# Patient Record
Sex: FEMALE | Race: WHITE | Employment: UNEMPLOYED | URBAN - METROPOLITAN AREA
[De-identification: names, ages, dates, MRNs, and addresses within clinical notes are randomized per-mention and may not be internally consistent; named-entity substitution may affect disease eponyms.]

---

## 2018-01-26 ENCOUNTER — OFFICE VISIT (OUTPATIENT)
Dept: ORTHOPEDIC SURGERY | Age: 83
End: 2018-01-26
Payer: MEDICARE

## 2018-01-26 VITALS — WEIGHT: 147 LBS | BODY MASS INDEX: 28.86 KG/M2 | HEIGHT: 60 IN

## 2018-01-26 DIAGNOSIS — M25.551 PAIN OF BOTH HIP JOINTS: Primary | ICD-10-CM

## 2018-01-26 DIAGNOSIS — M25.552 PAIN OF BOTH HIP JOINTS: Primary | ICD-10-CM

## 2018-01-26 PROCEDURE — G8484 FLU IMMUNIZE NO ADMIN: HCPCS | Performed by: ORTHOPAEDIC SURGERY

## 2018-01-26 PROCEDURE — 4040F PNEUMOC VAC/ADMIN/RCVD: CPT | Performed by: ORTHOPAEDIC SURGERY

## 2018-01-26 PROCEDURE — G8400 PT W/DXA NO RESULTS DOC: HCPCS | Performed by: ORTHOPAEDIC SURGERY

## 2018-01-26 PROCEDURE — 1036F TOBACCO NON-USER: CPT | Performed by: ORTHOPAEDIC SURGERY

## 2018-01-26 PROCEDURE — 1123F ACP DISCUSS/DSCN MKR DOCD: CPT | Performed by: ORTHOPAEDIC SURGERY

## 2018-01-26 PROCEDURE — 99213 OFFICE O/P EST LOW 20 MIN: CPT | Performed by: ORTHOPAEDIC SURGERY

## 2018-01-26 PROCEDURE — 1090F PRES/ABSN URINE INCON ASSESS: CPT | Performed by: ORTHOPAEDIC SURGERY

## 2018-01-26 PROCEDURE — G8427 DOCREV CUR MEDS BY ELIG CLIN: HCPCS | Performed by: ORTHOPAEDIC SURGERY

## 2018-01-26 PROCEDURE — G8419 CALC BMI OUT NRM PARAM NOF/U: HCPCS | Performed by: ORTHOPAEDIC SURGERY

## 2018-01-26 NOTE — PROGRESS NOTES
Mihaela Card M.D.            [address] [place] [address]., [address] [facility], [address] [facility]             Dept Phone: [phone]             Dept Fax:  [phone] returns today. She is a history of bilateral hip replacements. She's here basically because she's having difficulty ambulating. She has been seeing a specialist over at [facility] who has given her injections and she's not sure this helped out. She doesn't really complain really much of any hip pain per se    Physical examination notes that the patient has no pain on flexion internal or external rotation of her hips. She does have moderate straight leg raise on both sides. I did not do formal neurologic examination. Remainder examination noncontributory    Patient did have x-rays of her pelvis taken today reviewed by me. Her left total hips look to be good position on AP and lateral views. She does have some moderate erosion of the trochanter laterally on the right side but this is unchanged from previous x-rays. I am able to determine that she does have some degenerative scoliosis from the lower lumbar spine that we can see. Impression line status post bilateral total hips doing well  Degenerative scoliosis with as neurogenic claudication/spinal stenosis    Plan  I told the patient and her daughter who is here with her that if her symptoms exacerbate she is beginning to have fall she may need to have something more interventional than just injections. Apparently she had an MRI 6 months ago is been seen elsewhere for this. I did suggest that she does see a spinal specialist of her symptoms exacerbate. Otherwise back here on a when necessary basis          Review of Systems   Constitutional: Negative. HENT: Negative. Respiratory: Negative. Cardiovascular: Negative. Musculoskeletal: Negative. Neurological: Negative.          Past Medical History:   Diagnosis Date

## 2021-05-13 ENCOUNTER — IMMUNIZATION (OUTPATIENT)
Dept: PRIMARY CARE CLINIC | Age: 86
End: 2021-05-13
Payer: MEDICARE

## 2021-05-13 PROCEDURE — 0001A COVID-19, PFIZER VACCINE 30MCG/0.3ML DOSE: CPT | Performed by: INTERNAL MEDICINE

## 2021-05-13 PROCEDURE — 91300 COVID-19, PFIZER VACCINE 30MCG/0.3ML DOSE: CPT | Performed by: INTERNAL MEDICINE

## 2021-06-03 ENCOUNTER — IMMUNIZATION (OUTPATIENT)
Dept: PRIMARY CARE CLINIC | Age: 86
End: 2021-06-03
Payer: MEDICARE

## 2021-06-03 PROCEDURE — 91300 COVID-19, PFIZER VACCINE 30MCG/0.3ML DOSE: CPT | Performed by: INTERNAL MEDICINE

## 2021-06-03 PROCEDURE — 0002A COVID-19, PFIZER VACCINE 30MCG/0.3ML DOSE: CPT | Performed by: INTERNAL MEDICINE

## 2021-09-09 ENCOUNTER — OFFICE VISIT (OUTPATIENT)
Dept: ORTHOPEDIC SURGERY | Age: 86
End: 2021-09-09
Payer: MEDICARE

## 2021-09-09 DIAGNOSIS — M25.552 BILATERAL HIP PAIN: Primary | ICD-10-CM

## 2021-09-09 DIAGNOSIS — M25.551 BILATERAL HIP PAIN: Primary | ICD-10-CM

## 2021-09-09 PROCEDURE — 4004F PT TOBACCO SCREEN RCVD TLK: CPT | Performed by: ORTHOPAEDIC SURGERY

## 2021-09-09 PROCEDURE — G8428 CUR MEDS NOT DOCUMENT: HCPCS | Performed by: ORTHOPAEDIC SURGERY

## 2021-09-09 PROCEDURE — 1090F PRES/ABSN URINE INCON ASSESS: CPT | Performed by: ORTHOPAEDIC SURGERY

## 2021-09-09 PROCEDURE — 1123F ACP DISCUSS/DSCN MKR DOCD: CPT | Performed by: ORTHOPAEDIC SURGERY

## 2021-09-09 PROCEDURE — G8421 BMI NOT CALCULATED: HCPCS | Performed by: ORTHOPAEDIC SURGERY

## 2021-09-09 PROCEDURE — 4040F PNEUMOC VAC/ADMIN/RCVD: CPT | Performed by: ORTHOPAEDIC SURGERY

## 2021-09-09 PROCEDURE — 99213 OFFICE O/P EST LOW 20 MIN: CPT | Performed by: ORTHOPAEDIC SURGERY

## 2021-09-09 NOTE — PROGRESS NOTES
Soren Herrmann M.D.            [address] [facility] [address] [facility],[address], [address] [facility]           Dept Phone: [phone]           Dept Fax:  [phone] [address], [place]          Dept Phone: [phone]           Dept Fax:  [phone]      Chief Compliant:  Chief Complaint   Patient presents with    Pain     bilateral hip        History of Present Illness: This is a 80 y.o. female who presents to the clinic today for evaluation / follow up of history of bilateral hip replacements. Patient also has significant history of severe spinal stenosis. She was told that she had nonoperative. She does have claudication symptoms. Patient did have a history of a fall approximately month ago. She has complaints of bilateral shoulder pain but this is chronic for her as she has what appears to be chronic rotator cuff arthropathies. She is basically here today just to verify nothing is wrong with her hips. .       Review of Systems   Constitutional: Negative for fever, chills, sweats. Eyes: Negative for changes in vision, or pain. HENT: Negative for ear ache, epistaxis, or sore throat. Respiratory/Cardio: Negative for Chest pain, palpitations, SOB, or cough. Gastrointestinal: Negative for abdominal pain, N/V/D. Genitourinary: Negative for dysuria, frequency, urgency, or hematuria. Neurological: Negative for headache, numbness, or weakness. Integumentary: Negative for rash, itching, laceration, or abrasion. Musculoskeletal: Positive for Pain (bilateral hip)       Physical Exam:  Constitutional: Patient is oriented to person, place, and time. Patient appears well-developed and well nourished.    HENT: Negative otherwise noted  Head: Normocephalic and Atraumatic  Nose: Normal  Eyes: Conjunctivae and EOM are normal  Neck: Normal range of motion Neck supple. Respiratory/Cardio: Effort normal. No respiratory distress. Musculoskeletal: Physical examination notes I can motion the patient's left hip with no obvious discomfort. She could I actively flex her hip and I can internally X rotate with no obvious discomfort. No trochanteric tenderness. Examination of the patient's right hip notes she has very minimal discomfort on flexion internal ex rotation. She has some mild trochanteric tenderness on the right side but this is chronic for her as she does have a chronic erosion of her greater trochanter  Neurological: Patient is alert and oriented to person, place, and time. Normal strenght. No sensory deficit. Skin: Skin is warm and dry  Psychiatric: Behavior is normal. Thought content normal.  Nursing note and vitals reviewed. Labs and Imaging:     XR taken today:  XR PELVIS (1-2 VIEWS)    Result Date: 9/9/2021  X-rays today today reviewed by me show AP of the pelvis. Patient status post bilateral total hips. The patient's right hip has a constrained prosthesis and is stable acetabular femoral components. Patient does have bony erosion of the greater trochanter likely chronic to stress and revision setting. No acute process is noted. Patient has a left total hip arthroplasty as well. She has a cerclage wires associated with this no acute changes noted compared to views taken in the past.          Orders Placed This Encounter   Procedures    XR PELVIS (1-2 VIEWS)     Standing Status:   Future     Number of Occurrences:   1     Standing Expiration Date:   9/9/2022       Assessment and Plan:  1. Bilateral hip pain            This is a 80 y.o. female who presents to the clinic today for evaluation / follow up of status post bilateral total hips with no obvious acute injury.      Past History:    Current Outpatient Medications:     gabapentin (NEURONTIN) 100 MG capsule,  100 mg 3 times daily , Disp: , Rfl:     losartan (COZAAR) 100 MG tablet,  100 mg daily , Disp: , Rfl:     meloxicam (MOBIC) 15 MG tablet,  Take 15 mg by mouth daily , Disp: , Rfl:     omeprazole (PRILOSEC) 20 MG capsule,  20 mg Daily , Disp: , Rfl:     propranolol (INDERAL LA) 60 MG CR capsule,  60 mg daily , Disp: , Rfl:     raloxifene (EVISTA) 60 MG tablet,  60 mg daily , Disp: , Rfl:     traZODone (DESYREL) 100 MG tablet,  nightly , Disp: , Rfl:     aspirin 325 MG tablet, Take 325 mg by mouth daily, Disp: , Rfl:   Allergies   Allergen Reactions    Adhesive Tape Other (See Comments)     blisters    Ceclor [Cefaclor] Other (See Comments)     cough    Codeine Other (See Comments)     Too sleepy    Penicillins Hives    Vioxx [Rofecoxib] Hives    Morphine Nausea And Vomiting and Other (See Comments)     sleepy     Social History     Socioeconomic History    Marital status:      Spouse name: Not on file    Number of children: Not on file    Years of education: Not on file    Highest education level: Not on file   Occupational History    Occupation: retired   Tobacco Use    Smoking status: Former Smoker     Quit date: 1990     Years since quittin.2   Substance and Sexual Activity    Alcohol use: No    Drug use: No    Sexual activity: Not on file   Other Topics Concern    Not on file   Social History Narrative    Not on file     Social Determinants of Health     Financial Resource Strain:     Difficulty of Paying Living Expenses:    Food Insecurity:     Worried About [address] in the Last Year:     [facility] in the Last Year:    Transportation Needs:     Lack of Transportation (Medical):      Lack of Transportation (Non-Medical):    Physical Activity:     Days of Exercise per Week:     Minutes of Exercise per Session:    Stress:     Feeling of Stress :    Social Connections:     Frequency of Communication with Friends and Family:     Frequency of Social Gatherings with Friends and Family:     Attends Sabianist Services:     Active Member of Clubs or Organizations:     Attends Club or Organization Meetings:     Marital Status:    Intimate Partner Violence:     Fear of Current or Ex-Partner:     Emotionally Abused:     Physically Abused:     Sexually Abused:      Past Medical History:   Diagnosis Date    Arthritis     Diabetes mellitus ([address]     GERD (gastroesophageal reflux disease)     Hypertension      Past Surgical History:   Procedure Laterality Date    APPENDECTOMY      CARPAL TUNNEL RELEASE Right     JOINT REPLACEMENT Right     has had both right and left done (4 total)    OVARY REMOVAL Right     WRIST SURGERY Left 6-25-15    carpel tunnel, thumb surgery     No family history on file. Plan  Patient as well as patient's daughter performed and no acute findings are noted. They are happy to hear this. She was strongly advised to use a walker at all times rather than just a cane is certainly would love to avoid any falls and potentially future fractures. We will see her back here on an as-needed basis call if any problems prior    Provider Attestation:  Ángel Lopez, personally performed the services described in this documentation. All medical record entries made by the scribe were at my direction and in my presence. I have reviewed the chart and discharge instructions and agree that the records reflect my personal performance and is accurate and complete. Anupam Douglas MD. 09/09/21      Please note that this chart was generated using voice recognition Dragon dictation software. Although every effort was made to ensure the accuracy of this automated transcription, some errors in transcription may have occurred.

## 2022-03-16 ENCOUNTER — OFFICE VISIT (OUTPATIENT)
Dept: ORTHOPEDIC SURGERY | Age: 87
End: 2022-03-16
Payer: MEDICARE

## 2022-03-16 VITALS — WEIGHT: 147 LBS | BODY MASS INDEX: 28.86 KG/M2 | HEIGHT: 60 IN

## 2022-03-16 DIAGNOSIS — M25.511 RIGHT SHOULDER PAIN, UNSPECIFIED CHRONICITY: Primary | ICD-10-CM

## 2022-03-16 DIAGNOSIS — M25.512 LEFT SHOULDER PAIN, UNSPECIFIED CHRONICITY: ICD-10-CM

## 2022-03-16 PROCEDURE — 4040F PNEUMOC VAC/ADMIN/RCVD: CPT | Performed by: ORTHOPAEDIC SURGERY

## 2022-03-16 PROCEDURE — 1123F ACP DISCUSS/DSCN MKR DOCD: CPT | Performed by: ORTHOPAEDIC SURGERY

## 2022-03-16 PROCEDURE — G8417 CALC BMI ABV UP PARAM F/U: HCPCS | Performed by: ORTHOPAEDIC SURGERY

## 2022-03-16 PROCEDURE — G8428 CUR MEDS NOT DOCUMENT: HCPCS | Performed by: ORTHOPAEDIC SURGERY

## 2022-03-16 PROCEDURE — 99203 OFFICE O/P NEW LOW 30 MIN: CPT | Performed by: ORTHOPAEDIC SURGERY

## 2022-03-16 PROCEDURE — 20605 DRAIN/INJ JOINT/BURSA W/O US: CPT | Performed by: ORTHOPAEDIC SURGERY

## 2022-03-16 PROCEDURE — 1036F TOBACCO NON-USER: CPT | Performed by: ORTHOPAEDIC SURGERY

## 2022-03-16 PROCEDURE — G8484 FLU IMMUNIZE NO ADMIN: HCPCS | Performed by: ORTHOPAEDIC SURGERY

## 2022-03-16 PROCEDURE — 1090F PRES/ABSN URINE INCON ASSESS: CPT | Performed by: ORTHOPAEDIC SURGERY

## 2022-03-16 RX ORDER — AMITRIPTYLINE HYDROCHLORIDE 10 MG/1
10 TABLET, FILM COATED ORAL DAILY
COMMUNITY
Start: 2022-01-13

## 2022-03-16 RX ORDER — TRIAMCINOLONE ACETONIDE 40 MG/ML
40 INJECTION, SUSPENSION INTRA-ARTICULAR; INTRAMUSCULAR ONCE
Status: COMPLETED | OUTPATIENT
Start: 2022-03-16 | End: 2022-03-16

## 2022-03-16 RX ORDER — LIDOCAINE HYDROCHLORIDE 10 MG/ML
5 INJECTION, SOLUTION INFILTRATION; PERINEURAL ONCE
Status: COMPLETED | OUTPATIENT
Start: 2022-03-16 | End: 2022-03-16

## 2022-03-16 RX ORDER — LEVETIRACETAM 250 MG/1
250 TABLET ORAL DAILY
COMMUNITY
Start: 2021-11-11

## 2022-03-16 RX ADMIN — TRIAMCINOLONE ACETONIDE 40 MG: 40 INJECTION, SUSPENSION INTRA-ARTICULAR; INTRAMUSCULAR at 15:23

## 2022-03-16 RX ADMIN — LIDOCAINE HYDROCHLORIDE 5 ML: 10 INJECTION, SOLUTION INFILTRATION; PERINEURAL at 15:22

## 2022-03-16 NOTE — PROGRESS NOTES
Orthopedic Shoulder Encounter Note     Chief complaint: bilateral shoulder pain    HPI: Mikaela Broderick is a 80 y.o. right-hand-dominant female presenting for evaluation of both of her shoulders. She states that she has had pain for 20 years in both shoulders but her left shoulder hurts significantly more than the right. It is getting worse. She states that she has diffuse pain in her shoulders which is constant but worse with use. She does have associated stiffness and weakness. Previous treatment:    NSAIDs: Mobic    Physical Therapy: In the past    Injections: None    Surgeries: History of a left shoulder manipulation under anesthesia at least 30 years ago    Review of Systems:   Constitutional: Negative for fever, chills, sweats. Pain Score:   9  Neurological: Negative for headache, numbness, or weakness. Musculoskeletal: As noted in HPI     Past Medical History  Fidel Carlson  has a past medical history of Arthritis, Diabetes mellitus ([address] GERD (gastroesophageal reflux disease), and Hypertension. Past Surgical History  Fidel Carlson  has a past surgical history that includes joint replacement (Right); Appendectomy; Ovary removal (Right); Carpal tunnel release (Right); and Wrist surgery (Left, 6-25-15).     Current Medications  Current Outpatient Medications   Medication Sig Dispense Refill    levETIRAcetam (KEPPRA) 250 MG tablet Take 250 mg by mouth daily      meloxicam (MOBIC) 15 MG tablet   Take 15 mg by mouth daily       propranolol (INDERAL LA) 60 MG CR capsule   60 mg daily       raloxifene (EVISTA) 60 MG tablet   60 mg daily       traZODone (DESYREL) 100 MG tablet   nightly       amitriptyline (ELAVIL) 10 MG tablet Take 10 mg by mouth daily      gabapentin (NEURONTIN) 100 MG capsule   100 mg 3 times daily  (Patient not taking: Reported on 3/16/2022)      losartan (COZAAR) 100 MG tablet   100 mg daily  (Patient not taking: Reported on 3/16/2022)      omeprazole (PRILOSEC) 20 MG capsule   20 mg Daily  (Patient not taking: Reported on 3/16/2022)      aspirin 325 MG tablet Take 325 mg by mouth daily       No current facility-administered medications for this visit. Allergies  Allergies have been reviewed. Jean Paul Kowalski is allergic to adhesive tape, ceclor [cefaclor], codeine, penicillins, vioxx [rofecoxib], and morphine. Social History  Jean Paul Kowalski  reports that she quit smoking about 31 years ago. She has never used smokeless tobacco. She reports that she does not drink alcohol and does not use drugs. Family History  Nargis's family history is not on file. Physical Exam:     Ht 5' (1.524 m)   Wt 147 lb (66.7 kg)   BMI 28.71 kg/m²    Constitutional: Patient is oriented to person, place, and time. Patient appears well-developed and well nourished. Mental Status/psychiatric: Behavior is normal. Thought content normal.  HENT: Negative otherwise noted  Head: Normocephalic and Atraumatic  Nose: Normal  Respiratory/Cardio: Effort normal. No respiratory distress. Shoulder:    Skin: Skin is warm and dry; no swelling or obvious muscular atrophy.    Vasculature: 2+ radial pulses bilaterally  Neuro: Sensation grossly intact to light touch diffusely  Tenderness: Tender to palpation over the anterior aspect of both shoulders    ROM: (Degrees)    Right   A P   Left   A P    Elevation  135    Elevation  85   Abduction  120    Abduction  100    ER   80    ER   30   IR   L4    IR   L4   90 abd/ER      90 abd/ER     90 abd/IR      90 abd/IR     Crepitation  No    Crepitation Yes  Dyskenesia  No    Dyskenesia No      Muscle strength:    Right       Left    Deltoid   5    Deltoid   5  Supraspinatus  5    Supraspinatus  5  ER   5    ER   5  IR   5    IR   5    Special tests    Right   Rotator Cuff    Left    y   Painful arc    y   n   Pain with ER    y    n   Neer's     y    n   Hawkin's    y    n   Drop Arm    n  n   Lift off/Belly Press   n  n   ER Lag    n          [facility] Joint  n   [facility] tenderness   n  n   Cross-chest adduction  n       Labrum/biceps    n   Latah's    y   n   Biceps sheer    n      n   Speed's/Yergason's   y    y   Tenderness Biceps Groove  y    n   Titi's    n         Instability  n   Ant Apprehension   n    n   Post Apprehension   n    n   Ant Load shift    n    n   Post Load shift   n   n   Sulcus     n  n   Generalized Laxity   n  n   Relocation test   n  n   Crank test     n  n   Edmond-superior escape  n       Imaging:  Xrays: 4 views of the right shoulder obtained on 3/16/2022 were independently reviewed  Indications: Right shoulder pain  Findings: Glenohumeral joint space and acromioclavicular joint space appear to be well preserved. Some cystic changes involving the greater tuberosity. No obvious fracture dislocation or subluxation. Impression: Normal-appearing right shoulder radiographs with findings as outlined above. Xrays: 4 views of the left shoulder obtained on 3/16/2022 were independently reviewed  Indications: Left shoulder pain  Findings: Complete or near complete glenohumeral joint space loss with a central pattern of glenoid wear and small sized inferior humeral head osteophyte. Impression: Left shoulder radiographs with severe degenerative changes involving the glenohumeral joint as outlined above. Impression/Plan:     Chichi Sterling is a 80 y.o. old female with bilateral shoulder pain but the left shoulder appears to be what is bothering her the most today. 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 I had a discussion with the patient and her daughter today educating them both about her shoulders condition as well as treatment options available to her. I did recommend proceeding conservatively and so she would like to try cortisone injection today which was administered as outlined below.   I will have her follow-up my clinic as needed but she may return or call at anytime with

## 2022-05-13 ENCOUNTER — OFFICE VISIT (OUTPATIENT)
Dept: ORTHOPEDIC SURGERY | Age: 87
End: 2022-05-13
Payer: MEDICARE

## 2022-05-13 DIAGNOSIS — M25.552 HIP PAIN, CHRONIC, LEFT: Primary | ICD-10-CM

## 2022-05-13 DIAGNOSIS — G89.29 HIP PAIN, CHRONIC, LEFT: Primary | ICD-10-CM

## 2022-05-13 PROCEDURE — 99213 OFFICE O/P EST LOW 20 MIN: CPT | Performed by: ORTHOPAEDIC SURGERY

## 2022-05-13 PROCEDURE — G8428 CUR MEDS NOT DOCUMENT: HCPCS | Performed by: ORTHOPAEDIC SURGERY

## 2022-05-13 PROCEDURE — 1090F PRES/ABSN URINE INCON ASSESS: CPT | Performed by: ORTHOPAEDIC SURGERY

## 2022-05-13 PROCEDURE — 1123F ACP DISCUSS/DSCN MKR DOCD: CPT | Performed by: ORTHOPAEDIC SURGERY

## 2022-05-13 PROCEDURE — G8417 CALC BMI ABV UP PARAM F/U: HCPCS | Performed by: ORTHOPAEDIC SURGERY

## 2022-05-13 PROCEDURE — 4040F PNEUMOC VAC/ADMIN/RCVD: CPT | Performed by: ORTHOPAEDIC SURGERY

## 2022-05-13 PROCEDURE — 1036F TOBACCO NON-USER: CPT | Performed by: ORTHOPAEDIC SURGERY

## 2022-05-13 RX ORDER — METHYLPREDNISOLONE 4 MG/1
4 TABLET ORAL SEE ADMIN INSTRUCTIONS
Qty: 1 KIT | Refills: 0 | Status: SHIPPED | OUTPATIENT
Start: 2022-05-13 | End: 2022-05-19

## 2022-05-13 NOTE — PROGRESS NOTES
Irwin Danielle M.D.            [address] [place] [address]., [address] [facility],[address], [address] [facility]           Dept Phone: [phone]           Dept Fax:  [phone]           [place]          Dept Phone: [phone]           Dept Fax:  [phone]      Chief Compliant:  Chief Complaint   Patient presents with    Pain     Lt hip        History of Present Illness: This is a 80 y.o. female who presents to the clinic today for evaluation / follow up of left buttock pain. Patient is 51-year-old female who has a history of bilateral total hip arthroplasties done many years ago. She has not had any recent trauma or fall. She is also noted to have a here history of pretty severe spinal stenosis. She is complaining of left buttock pain going down her leg. She does not really have any hip pain per se. .       Review of Systems   Constitutional: Negative for fever, chills, sweats. Eyes: Negative for changes in vision, or pain. HENT: Negative for ear ache, epistaxis, or sore throat. Respiratory/Cardio: Negative for Chest pain, palpitations, SOB, or cough. Gastrointestinal: Negative for abdominal pain, N/V/D. Genitourinary: Negative for dysuria, frequency, urgency, or hematuria. Neurological: Negative for headache, numbness, or weakness. Integumentary: Negative for rash, itching, laceration, or abrasion. Musculoskeletal: Positive for Pain (Lt hip)       Physical Exam:  Constitutional: Patient is oriented to person, place, and time. Patient appears well-developed and well nourished. HENT: Negative otherwise noted  Head: Normocephalic and Atraumatic  Nose: Normal  Eyes: Conjunctivae and EOM are normal  Neck: Normal range of motion Neck supple. Respiratory/Cardio: Effort normal. No respiratory distress.   Musculoskeletal: Examination notes that she has no pain whatsoever on flexion internal ex Tatian of her left hip. She has tenderness in her sciatic notch area and this is traceable down her leg. Mildly positive straight leg raise no other contributory findings  Neurological: Patient is alert and oriented to person, place, and time. Normal strenght. No sensory deficit. Skin: Skin is warm and dry  Psychiatric: Behavior is normal. Thought content normal.  Nursing note and vitals reviewed. Labs and Imaging:     XR taken today: None taken today  No results found. Orders Placed This Encounter   Procedures    [facility]     Referral Priority:   Routine     Referral Type:   Eval and Treat     Referral Reason:   Specialty Services Required     Requested Specialty:   Physical Therapist     Number of Visits Requested:   1       Assessment and Plan:  Status post bilateral total hips  Severe spinal stenosis  Sciatica left        This is a 80 y.o. female who presents to the clinic today for evaluation / follow up of sciatic pain left.      Past History:    Current Outpatient Medications:     methylPREDNISolone (MEDROL DOSEPACK) 4 MG tablet, Take 1 tablet by mouth See Admin Instructions for 6 days Take by mouth., Disp: 1 kit, Rfl: 0    amitriptyline (ELAVIL) 10 MG tablet, Take 10 mg by mouth daily, Disp: , Rfl:     levETIRAcetam (KEPPRA) 250 MG tablet, Take 250 mg by mouth daily, Disp: , Rfl:     gabapentin (NEURONTIN) 100 MG capsule,  100 mg 3 times daily  (Patient not taking: Reported on 3/16/2022), Disp: , Rfl:     losartan (COZAAR) 100 MG tablet,  100 mg daily  (Patient not taking: Reported on 3/16/2022), Disp: , Rfl:     meloxicam (MOBIC) 15 MG tablet,  Take 15 mg by mouth daily , Disp: , Rfl:     omeprazole (PRILOSEC) 20 MG capsule,  20 mg Daily  (Patient not taking: Reported on 3/16/2022), Disp: , Rfl:     propranolol (INDERAL LA) 60 MG CR capsule,  60 mg daily , Disp: , Rfl:     raloxifene (EVISTA) 60 MG tablet,  60 mg daily , Disp: , Rfl:     traZODone (DESYREL) 100 MG tablet,  nightly , Disp: , Rfl:     aspirin 325 MG tablet, Take 325 mg by mouth daily, Disp: , Rfl:   Allergies   Allergen Reactions    Adhesive Tape Other (See Comments)     blisters    Ceclor [Cefaclor] Other (See Comments)     cough    Codeine Other (See Comments)     Too sleepy    Penicillins Hives    Vioxx [Rofecoxib] Hives    Morphine Nausea And Vomiting and Other (See Comments)     sleepy     Social History     Socioeconomic History    Marital status:      Spouse name: Not on file    Number of children: Not on file    Years of education: Not on file    Highest education level: Not on file   Occupational History    Occupation: retired   Tobacco Use    Smoking status: Former Smoker     Quit date: 1990     Years since quittin.9    Smokeless tobacco: Never Used   Substance and Sexual Activity    Alcohol use: No    Drug use: No    Sexual activity: Not on file   Other Topics Concern    Not on file   Social History Narrative    Not on file     Social Determinants of Health     Financial Resource Strain:     Difficulty of Paying Living Expenses: Not on file   Food Insecurity:     Worried About [address] in the Last Year: Not on file    [address] in the Last Year: Not on file   Transportation Needs:     Lack of Transportation (Medical): Not on file    Lack of Transportation (Non-Medical):  Not on file   Physical Activity:     Days of Exercise per Week: Not on file    Minutes of Exercise per Session: Not on file   Stress:     Feeling of Stress : Not on file   Social Connections:     Frequency of Communication with Friends and Family: Not on file    Frequency of Social Gatherings with Friends and Family: Not on file    Attends Latter-day Services: Not on file    Active Member of Clubs or Organizations: Not on file    Attends Club or Organization Meetings: Not on file    Marital Status: Not on file Intimate Partner Violence:     Fear of Current or Ex-Partner: Not on file    Emotionally Abused: Not on file    Physically Abused: Not on file    Sexually Abused: Not on file   Housing Stability:     Unable to Pay for Housing in the Last Year: Not on file    Number of [place] in the Last Year: Not on file    Unstable Housing in the Last Year: Not on file     Past Medical History:   Diagnosis Date    Arthritis     Diabetes mellitus ([address]     GERD (gastroesophageal reflux disease)     Hypertension      Past Surgical History:   Procedure Laterality Date    APPENDECTOMY      CARPAL TUNNEL RELEASE Right     JOINT REPLACEMENT Right     has had both right and left done (4 total)    OVARY REMOVAL Right     WRIST SURGERY Left 6-25-15    carpel tunnel, thumb surgery     No family history on file. Plan  Patient was given a prescription for physical therapy to be dressed this area also Medrol Dosepak. She is aware that she has severe lumbar degenerative disc disease but at age 80 she does not wish to have any done. Back here. Provider Attestation:  Shelly Coughlin, personally performed the services described in this documentation. All medical record entries made by the scribe were at my direction and in my presence. I have reviewed the chart and discharge instructions and agree that the records reflect my personal performance and is accurate and complete. Guilherme Miramontes MD. 05/13/22      Please note that this chart was generated using voice recognition Dragon dictation software. Although every effort was made to ensure the accuracy of this automated transcription, some errors in transcription may have occurred.

## 2022-05-19 ENCOUNTER — TELEPHONE (OUTPATIENT)
Dept: ORTHOPEDIC SURGERY | Age: 87
End: 2022-05-19

## 2022-05-19 NOTE — TELEPHONE ENCOUNTER
Patient called stating that her mother needs home therapy due to be ing unable to drive. She was ordered outpatient PT at her last appointment. Patients daughter was intially told that the order can be put I n and some one from the agency will get back with her. However writer was informed that patient has to call insurance, see what agency is covered and the order can then be place,she was left a VM with recommendations

## 2023-01-18 ENCOUNTER — OFFICE VISIT (OUTPATIENT)
Dept: ORTHOPEDIC SURGERY | Age: 88
End: 2023-01-18
Payer: MEDICARE

## 2023-01-18 VITALS — BODY MASS INDEX: 28.86 KG/M2 | HEIGHT: 60 IN | WEIGHT: 147 LBS | RESPIRATION RATE: 16 BRPM

## 2023-01-18 DIAGNOSIS — M19.012 OSTEOARTHRITIS OF GLENOHUMERAL JOINT, LEFT: Primary | ICD-10-CM

## 2023-01-18 DIAGNOSIS — M25.512 ACUTE PAIN OF LEFT SHOULDER: ICD-10-CM

## 2023-01-18 PROCEDURE — 99999 PR OFFICE/OUTPT VISIT,PROCEDURE ONLY: CPT | Performed by: ORTHOPAEDIC SURGERY

## 2023-01-18 PROCEDURE — 20610 DRAIN/INJ JOINT/BURSA W/O US: CPT | Performed by: ORTHOPAEDIC SURGERY

## 2023-01-18 RX ORDER — LIDOCAINE HYDROCHLORIDE 10 MG/ML
5 INJECTION, SOLUTION INFILTRATION; PERINEURAL ONCE
Status: COMPLETED | OUTPATIENT
Start: 2023-01-18 | End: 2023-01-18

## 2023-01-18 RX ORDER — TRIAMCINOLONE ACETONIDE 40 MG/ML
40 INJECTION, SUSPENSION INTRA-ARTICULAR; INTRAMUSCULAR ONCE
Status: COMPLETED | OUTPATIENT
Start: 2023-01-18 | End: 2023-01-18

## 2023-01-18 RX ADMIN — LIDOCAINE HYDROCHLORIDE 5 ML: 10 INJECTION, SOLUTION INFILTRATION; PERINEURAL at 16:29

## 2023-01-18 RX ADMIN — TRIAMCINOLONE ACETONIDE 40 MG: 40 INJECTION, SUSPENSION INTRA-ARTICULAR; INTRAMUSCULAR at 16:30

## 2023-01-18 NOTE — PROGRESS NOTES
HPI: Ms. Elba Castillo is a 15-year-old who is presenting today for reevaluation of her left shoulder. She was seen in my clinic close to a year ago and diagnosed with severe arthritis in the shoulder. She did receive a cortisone injection at that time which she states helped a little bit. If not clear how long the effects of the injection lasted. She aggravated the shoulder 3 weeks ago when she tripped on her footstool and fell. At this time her pain is rated as a 9/10. Physical examination:  Evaluation of the patient's left shoulder and upper extremity demonstrates intact skin without warmth, erythema, or significant swelling. She has limitation in active and passive range of motion due to pain and she does have crepitation with range of motion. Sensation is grossly intact light touch in all dermatomes and she has a 2+ radial pulse with brisk capillary refill in her fingers. Imaging studies:  4 x-ray views of the left shoulder completed on 1/18/2023 were reviewed independently demonstrating complete glenohumeral joint space loss with a central pattern of glenoid wear. No obvious fracture, dislocation or subluxation. Impression and plan: Ms. Elba Castillo is a 15-year-old with severe left glenohumeral joint osteoarthritis. We once again had a discussion about treatment options moving forward including continued conservative management and surgical intervention. She is not interested in any surgery and so we are going to continue to proceed with conservative management. To this end a cortisone injection was requested and administered as outlined below. I will see her back for reevaluation but she may return or call at anytime with persistent or worsening symptoms and with any questions or concerns. Procedure: left shoulder glenohumeral joint and subacromial space injections  Following an appropriate discussion with the patient regarding the risks and benefits of the procedure she consented to proceed. her left shoulder was prepped using chlorhexadine solution. Using aseptic technique and through a posterior approach, her left shoulder glenohumeral joint space was injected with 3 cc of a 6 cc mixture of 1cc 40mg/ml kenalog and 5 cc of 1% lidocaine without epinephrine. The remaining 3 cc was injected into the subacromial space by redirecting the needle superiorly. A band aid was applied to the injection site. she tolerated the injection with no immediate adverse reactions.

## 2024-08-27 ENCOUNTER — HOSPITAL ENCOUNTER (EMERGENCY)
Age: 89
Discharge: HOME | End: 2024-08-27
Attending: EMERGENCY MEDICINE
Payer: MEDICARE

## 2024-08-27 ENCOUNTER — APPOINTMENT (OUTPATIENT)
Dept: GENERAL RADIOLOGY | Age: 89
End: 2024-08-27
Payer: MEDICARE

## 2024-08-27 VITALS
HEART RATE: 74 BPM | BODY MASS INDEX: 24.6 KG/M2 | DIASTOLIC BLOOD PRESSURE: 102 MMHG | TEMPERATURE: 98.2 F | HEIGHT: 59 IN | OXYGEN SATURATION: 95 % | WEIGHT: 122 LBS | RESPIRATION RATE: 16 BRPM | SYSTOLIC BLOOD PRESSURE: 170 MMHG

## 2024-08-27 DIAGNOSIS — T18.108A FOREIGN BODY IN ESOPHAGUS, INITIAL ENCOUNTER: Primary | ICD-10-CM

## 2024-08-27 PROCEDURE — 2580000003 HC RX 258: Performed by: NURSE PRACTITIONER

## 2024-08-27 PROCEDURE — 96374 THER/PROPH/DIAG INJ IV PUSH: CPT

## 2024-08-27 PROCEDURE — 71045 X-RAY EXAM CHEST 1 VIEW: CPT

## 2024-08-27 PROCEDURE — 6360000002 HC RX W HCPCS: Performed by: NURSE PRACTITIONER

## 2024-08-27 PROCEDURE — 96375 TX/PRO/DX INJ NEW DRUG ADDON: CPT

## 2024-08-27 PROCEDURE — 99284 EMERGENCY DEPT VISIT MOD MDM: CPT

## 2024-08-27 RX ORDER — ONDANSETRON 2 MG/ML
4 INJECTION INTRAMUSCULAR; INTRAVENOUS ONCE
Status: COMPLETED | OUTPATIENT
Start: 2024-08-27 | End: 2024-08-27

## 2024-08-27 RX ORDER — GLUCAGON 1 MG/ML
1 KIT INJECTION ONCE
Status: COMPLETED | OUTPATIENT
Start: 2024-08-27 | End: 2024-08-27

## 2024-08-27 RX ORDER — 0.9 % SODIUM CHLORIDE 0.9 %
500 INTRAVENOUS SOLUTION INTRAVENOUS ONCE
Status: COMPLETED | OUTPATIENT
Start: 2024-08-27 | End: 2024-08-27

## 2024-08-27 RX ADMIN — SODIUM CHLORIDE 500 ML: 9 INJECTION, SOLUTION INTRAVENOUS at 13:52

## 2024-08-27 RX ADMIN — Medication 1 MG: at 13:52

## 2024-08-27 RX ADMIN — ONDANSETRON 4 MG: 2 INJECTION INTRAMUSCULAR; INTRAVENOUS at 13:52

## 2024-08-27 ASSESSMENT — PAIN SCALES - GENERAL: PAINLEVEL_OUTOF10: 2

## 2024-08-27 ASSESSMENT — PAIN - FUNCTIONAL ASSESSMENT: PAIN_FUNCTIONAL_ASSESSMENT: 0-10

## 2024-08-27 NOTE — DISCHARGE INSTRUCTIONS
Home.  Warm fluids this evening such as broth, warm tea with honey, soft foods tonight.  Return to the emergency department for difficulty swallowing, difficulty breathing, worsening symptoms in any way, or any other concerns.

## 2024-08-27 NOTE — ED PROVIDER NOTES
[facility] EMERGENCY DEPARTMENT  EMERGENCY DEPARTMENT ENCOUNTER      Pt Name: Nargis Martin  MRN: [id number]  Birthdate 1935  Date of evaluation: 8/27/2024  Provider: ULISES Berman CNP  4:42 PM    CHIEF COMPLAINT       Chief Complaint   Patient presents with    foreign body in throat     From swallowing a pill this morning, states history of esophageal issues and sees ENT         HISTORY OF PRESENT ILLNESS    Nargis Martin is a 89 y.o. female who presents to the emergency department for evaluation of a choking episode.  Patient states that about an hour prior to arrival she tried to swallow 3 pills at 1 time.  She states they were all capsules.  She feels that they are stuck in her throat.  She is following, not tolerating her secretions.  Brought in by EMS.  Has a history of choking on pills.  She saw a GI physician who told her that she was not a good candidate for esophageal dilatation however she sees ENT currently and they are trying to manage her symptoms medically. She takes pepcid to control the GERD.     HPI    Nursing Notes were reviewed.    REVIEW OF SYSTEMS       Review of Systems   All other systems reviewed and are negative.      Except as noted above the remainder of the review of systems was reviewed and negative.       PAST MEDICAL HISTORY     Past Medical History:   Diagnosis Date    Arthritis     Diabetes mellitus (HCC)     GERD (gastroesophageal reflux disease)     Hypertension          SURGICAL HISTORY       Past Surgical History:   Procedure Laterality Date    APPENDECTOMY      CARPAL TUNNEL RELEASE Right     JOINT REPLACEMENT Right     has had both right and left done (4 total)    OVARY REMOVAL Right     WRIST SURGERY Left 6-25-15    carpel tunnel, thumb surgery         CURRENT MEDICATIONS       Discharge Medication List as of 8/27/2024  3:32 PM        CONTINUE these medications which have NOT CHANGED    Details   amitriptyline (ELAVIL) 10 MG tablet Take 10  Systolic BP Percentile Diastolic BP Percentile Temp Temp Source Pulse Respirations SpO2   08/27/24 1321 -- -- 08/27/24 1319 08/27/24 1319 08/27/24 1319 08/27/24 1321 08/27/24 1321   (!) 170/102   98.2 °F (36.8 °C) Oral 74 16 95 %      Height Weight - Scale         08/27/24 1321 08/27/24 1321         1.499 m (4' 11\") 55.3 kg (122 lb)             Physical Exam  Vitals and nursing note reviewed.   Constitutional:       General: She is not in acute distress.     Appearance: Normal appearance. She is not toxic-appearing.   HENT:      Head: Normocephalic and atraumatic.      Right Ear: External ear normal.      Left Ear: External ear normal.      Nose: Nose normal.      Mouth/Throat:      Mouth: Mucous membranes are moist.      Pharynx: Oropharynx is clear.   Eyes:      General:         Right eye: No discharge.         Left eye: No discharge.      Extraocular Movements: Extraocular movements intact.      Conjunctiva/sclera: Conjunctivae normal.   Cardiovascular:      Rate and Rhythm: Normal rate and regular rhythm.      Pulses: Normal pulses.      Heart sounds: Normal heart sounds. No murmur heard.  Pulmonary:      Effort: Pulmonary effort is normal. No respiratory distress.      Breath sounds: Normal breath sounds.   Abdominal:      General: Abdomen is flat. There is no distension.      Palpations: Abdomen is soft. There is no mass (no pulsitile mass).      Tenderness: There is no abdominal tenderness.   Musculoskeletal:         General: No swelling or tenderness. Normal range of motion.      Cervical back: Normal range of motion and neck supple. No rigidity or tenderness.   Skin:     General: Skin is warm and dry.      Capillary Refill: Capillary refill takes less than 2 seconds.   Neurological:      General: No focal deficit present.      Mental Status: She is alert and oriented to person, place, and time.   Psychiatric:         Mood and Affect: Mood normal.         Behavior: Behavior normal.         DIAGNOSTIC RESULTS

## 2024-08-28 NOTE — ED PROVIDER NOTES
[facility] Emergency Department  [address]  [place] [address]  Phone: [phone]  Fax: [phone]      Attending Physician Attestation    Based on the medical record, the care appears appropriate. I was personally available for consultation in the Emergency Department. I did have a discussion with our midlevel provider regarding the care of this patient.  I reviewed the mid level provider's note and agree with the documented findings and plan of care.   I have reviewed the emergency nurses triage note. I agree with the chief complaint, past medical history, past surgical history, allergies, medications, social and family history as documented unless otherwise noted below.       CHIEF COMPLAINT       Chief Complaint   Patient presents with    foreign body in throat     From swallowing a pill this morning, states history of esophageal issues and sees ENT         PAST MEDICAL HISTORY    has a past medical history of Arthritis, Diabetes mellitus (HCC), GERD (gastroesophageal reflux disease), and Hypertension.    SURGICAL HISTORY      has a past surgical history that includes joint replacement (Right); Appendectomy; Ovary removal (Right); Carpal tunnel release (Right); and Wrist surgery (Left, 6-25-15).    CURRENT MEDICATIONS       Discharge Medication List as of 8/27/2024  3:32 PM        CONTINUE these medications which have NOT CHANGED    Details   amitriptyline (ELAVIL) 10 MG tablet Take 10 mg by mouth dailyHistorical Med      levETIRAcetam (KEPPRA) 250 MG tablet Take 250 mg by mouth dailyHistorical Med      gabapentin (NEURONTIN) 100 MG capsule   100 mg 3 times daily       losartan (COZAAR) 100 MG tablet   100 mg daily       meloxicam (MOBIC) 15 MG tablet   Take 15 mg by mouth daily       omeprazole (PRILOSEC) 20 MG capsule   20 mg Daily       propranolol (INDERAL LA) 60 MG CR capsule   60 mg daily       raloxifene (EVISTA) 60 MG tablet   60 mg daily       traZODone (DESYREL) 100

## 2024-11-28 ENCOUNTER — APPOINTMENT (OUTPATIENT)
Dept: GENERAL RADIOLOGY | Age: 88
End: 2024-11-28
Payer: MEDICARE

## 2024-11-28 ENCOUNTER — HOSPITAL ENCOUNTER (EMERGENCY)
Age: 88
Discharge: HOME | End: 2024-11-28
Attending: STUDENT IN AN ORGANIZED HEALTH CARE EDUCATION/TRAINING PROGRAM
Payer: MEDICARE

## 2024-11-28 VITALS
HEIGHT: 59 IN | RESPIRATION RATE: 15 BRPM | DIASTOLIC BLOOD PRESSURE: 77 MMHG | OXYGEN SATURATION: 96 % | HEART RATE: 63 BPM | SYSTOLIC BLOOD PRESSURE: 117 MMHG | WEIGHT: 122 LBS | TEMPERATURE: 98.3 F | BODY MASS INDEX: 24.6 KG/M2

## 2024-11-28 DIAGNOSIS — N39.0 URINARY TRACT INFECTION WITHOUT HEMATURIA, SITE UNSPECIFIED: Primary | ICD-10-CM

## 2024-11-28 LAB
ALBUMIN SERPL-MCNC: 3.6 G/DL (ref 3.5–5.2)
ALBUMIN/GLOB SERPL: 1.2 {RATIO} (ref 1–2.5)
ALP SERPL-CCNC: 57 U/L (ref 35–104)
ALT SERPL-CCNC: 11 U/L (ref 5–33)
ANION GAP SERPL CALCULATED.3IONS-SCNC: 9 MMOL/L (ref 9–17)
AST SERPL-CCNC: 13 U/L
BACTERIA URNS QL MICRO: ABNORMAL
BASOPHILS # BLD: 0 K/UL (ref 0–0.2)
BASOPHILS NFR BLD: 1 % (ref 0–2)
BILIRUB SERPL-MCNC: 0.2 MG/DL (ref 0.3–1.2)
BILIRUB UR QL STRIP: NEGATIVE
BUN SERPL-MCNC: 19 MG/DL (ref 8–23)
CALCIUM SERPL-MCNC: 8.6 MG/DL (ref 8.6–10.4)
CHARACTER UR: ABNORMAL
CHLORIDE SERPL-SCNC: 104 MMOL/L (ref 98–107)
CLARITY UR: CLEAR
CO2 SERPL-SCNC: 28 MMOL/L (ref 20–31)
COLOR UR: YELLOW
CREAT SERPL-MCNC: 1.1 MG/DL (ref 0.5–0.9)
EOSINOPHIL # BLD: 0.1 K/UL (ref 0–0.4)
EOSINOPHILS RELATIVE PERCENT: 2 % (ref 1–4)
EPI CELLS #/AREA URNS HPF: ABNORMAL /HPF (ref 0–5)
ERYTHROCYTE [DISTWIDTH] IN BLOOD BY AUTOMATED COUNT: 13.7 % (ref 12.5–15.4)
GFR, ESTIMATED: 48 ML/MIN/1.73M2
GLUCOSE SERPL-MCNC: 119 MG/DL (ref 70–99)
GLUCOSE UR STRIP-MCNC: NEGATIVE MG/DL
HCT VFR BLD AUTO: 35.9 % (ref 36–46)
HGB BLD-MCNC: 11.9 G/DL (ref 12–16)
HGB UR QL STRIP.AUTO: ABNORMAL
KETONES UR STRIP-MCNC: ABNORMAL MG/DL
LEUKOCYTE ESTERASE UR QL STRIP: ABNORMAL
LIPASE SERPL-CCNC: 13 U/L (ref 13–60)
LYMPHOCYTES NFR BLD: 0.9 K/UL (ref 1–4.8)
LYMPHOCYTES RELATIVE PERCENT: 14 % (ref 24–44)
MCH RBC QN AUTO: 29.9 PG (ref 26–34)
MCHC RBC AUTO-ENTMCNC: 33.2 G/DL (ref 31–37)
MCV RBC AUTO: 90.1 FL (ref 80–100)
MONOCYTES NFR BLD: 0.6 K/UL (ref 0.1–1.2)
MONOCYTES NFR BLD: 9 % (ref 2–11)
NEUTROPHILS NFR BLD: 74 % (ref 36–66)
NEUTS SEG NFR BLD: 4.6 K/UL (ref 1.8–7.7)
NITRITE UR QL STRIP: NEGATIVE
PH UR STRIP: 6 [PH] (ref 5–8)
PLATELET # BLD AUTO: 191 K/UL (ref 140–450)
PMV BLD AUTO: 8 FL (ref 6–12)
POTASSIUM SERPL-SCNC: 3.4 MMOL/L (ref 3.7–5.3)
PROT SERPL-MCNC: 6.7 G/DL (ref 6.4–8.3)
PROT UR STRIP-MCNC: ABNORMAL MG/DL
RBC # BLD AUTO: 3.99 M/UL (ref 4–5.2)
RBC #/AREA URNS HPF: ABNORMAL /HPF (ref 0–2)
SARS-COV-2 RDRP RESP QL NAA+PROBE: NOT DETECTED
SODIUM SERPL-SCNC: 141 MMOL/L (ref 135–144)
SP GR UR STRIP: 1.03 (ref 1–1.03)
SPECIMEN DESCRIPTION: NORMAL
TROPONIN I SERPL HS-MCNC: 10 NG/L (ref 0–14)
TROPONIN I SERPL HS-MCNC: 11 NG/L (ref 0–14)
UROBILINOGEN UR STRIP-ACNC: NORMAL EU/DL (ref 0–1)
WBC #/AREA URNS HPF: ABNORMAL /HPF (ref 0–5)
WBC OTHER # BLD: 6.2 K/UL (ref 3.5–11)

## 2024-11-28 PROCEDURE — 83690 ASSAY OF LIPASE: CPT

## 2024-11-28 PROCEDURE — 87086 URINE CULTURE/COLONY COUNT: CPT

## 2024-11-28 PROCEDURE — 84484 ASSAY OF TROPONIN QUANT: CPT

## 2024-11-28 PROCEDURE — 87077 CULTURE AEROBIC IDENTIFY: CPT

## 2024-11-28 PROCEDURE — 80053 COMPREHEN METABOLIC PANEL: CPT

## 2024-11-28 PROCEDURE — 36415 COLL VENOUS BLD VENIPUNCTURE: CPT

## 2024-11-28 PROCEDURE — 93005 ELECTROCARDIOGRAM TRACING: CPT | Performed by: NURSE PRACTITIONER

## 2024-11-28 PROCEDURE — 6370000000 HC RX 637 (ALT 250 FOR IP): Performed by: NURSE PRACTITIONER

## 2024-11-28 PROCEDURE — 85025 COMPLETE CBC W/AUTO DIFF WBC: CPT

## 2024-11-28 PROCEDURE — 87635 SARS-COV-2 COVID-19 AMP PRB: CPT

## 2024-11-28 PROCEDURE — 87184 SC STD DISK METHOD PER PLATE: CPT

## 2024-11-28 PROCEDURE — 71045 X-RAY EXAM CHEST 1 VIEW: CPT

## 2024-11-28 PROCEDURE — 87186 SC STD MICRODIL/AGAR DIL: CPT

## 2024-11-28 PROCEDURE — 81001 URINALYSIS AUTO W/SCOPE: CPT

## 2024-11-28 PROCEDURE — 99285 EMERGENCY DEPT VISIT HI MDM: CPT | Performed by: STUDENT IN AN ORGANIZED HEALTH CARE EDUCATION/TRAINING PROGRAM

## 2024-11-28 RX ORDER — CEPHALEXIN 500 MG/1
500 CAPSULE ORAL 2 TIMES DAILY
Qty: 14 CAPSULE | Refills: 0 | Status: SHIPPED | OUTPATIENT
Start: 2024-11-28 | End: 2024-12-05

## 2024-11-28 RX ADMIN — CEPHALEXIN 500 MG: 250 CAPSULE ORAL at 20:15

## 2024-11-28 ASSESSMENT — PAIN - FUNCTIONAL ASSESSMENT: PAIN_FUNCTIONAL_ASSESSMENT: NONE - DENIES PAIN

## 2024-11-28 NOTE — ED PROVIDER NOTES
[facility] EMERGENCY DEPARTMENT  EMERGENCY DEPARTMENT ENCOUNTER      Pt Name: Nargis Martin  MRN: [id number]  Birthdate 1935  Date of evaluation: 11/28/2024  Provider: ULISES Berman CNP  3:24 PM    CHIEF COMPLAINT       Chief Complaint   Patient presents with    Fatigue     X several days. Been sneezing a lot \"feels like a cold\"         HISTORY OF PRESENT ILLNESS    Nargis Martin is a 89 y.o. female who presents to the emergency department for fatigue.  Patient states over the past few days she has not been feeling well.  She really unable to give me any specific complaints just states that she feels little bit weaker than normal and feels that she might coming down with a cold because she is been sleeping a lot.  Today, she was getting up from her chair and slid down onto the floor.  She was unable to get herself up and daughter called EMS.  Patient reports that she lives with her daughter.  She has not told her daughter that she is been feeling unwell because she says but that she would call EMS all the time.   Patient denies chest pain, shortness of breath, a little bit of abdominal pain today before she had a bowel movement but that is gone now.  No nausea no vomiting    HPI    Nursing Notes were reviewed.    REVIEW OF SYSTEMS       Review of Systems   All other systems reviewed and are negative.      Except as noted above the remainder of the review of systems was reviewed and negative.       PAST MEDICAL HISTORY     Past Medical History:   Diagnosis Date    Arthritis     Diabetes mellitus (HCC)     GERD (gastroesophageal reflux disease)     Hypertension          SURGICAL HISTORY       Past Surgical History:   Procedure Laterality Date    APPENDECTOMY      CARPAL TUNNEL RELEASE Right     JOINT REPLACEMENT Right     has had both right and left done (4 total)    OVARY REMOVAL Right     WRIST SURGERY Left 6-25-15    carpel tunnel, thumb surgery         CURRENT MEDICATIONS    cardiologist.    Interpreted by ED attending physician    RADIOLOGY:   Non-plain film images such as CT, Ultrasound and MRI are read by the radiologist. Plain radiographic images are visualized and preliminarily interpreted by the emergency physician with the below findings:        Interpretation per the Radiologist below, if available at the time of this note:    XR CHEST PORTABLE   Final Result   No acute abnormality.               ED BEDSIDE ULTRASOUND:   Performed by ED Physician - none    LABS:  Labs Reviewed   CULTURE, URINE - Abnormal; Notable for the following components:       Result Value    Culture   (*)     Value: ESCHERICHIA COLI >100,000 CFU/ML Identification by MALDI-TOF    Culture   (*)     Value: ENTEROBACTER CLOACAE COMPLEX >100,000 CFU/ML Identification by MALDI-TOF    All other components within normal limits   CBC WITH AUTO DIFFERENTIAL - Abnormal; Notable for the following components:    RBC 3.99 (*)     Hemoglobin 11.9 (*)     Hematocrit 35.9 (*)     Neutrophils % 74 (*)     Lymphocytes % 14 (*)     Lymphocytes Absolute 0.90 (*)     All other components within normal limits   COMPREHENSIVE METABOLIC PANEL - Abnormal; Notable for the following components:    Potassium 3.4 (*)     Glucose 119 (*)     Creatinine 1.1 (*)     Est, Glom Filt Rate 48 (*)     Total Bilirubin 0.2 (*)     All other components within normal limits   URINALYSIS WITH REFLEX TO CULTURE - Abnormal; Notable for the following components:    Ketones, Urine TRACE (*)     Urine Hgb SMALL (*)     Protein, UA 2+ (*)     Leukocyte Esterase, Urine MODERATE (*)     All other components within normal limits   MICROSCOPIC URINALYSIS - Abnormal; Notable for the following components:    Bacteria, UA MODERATE (*)     Other Observations UA Culture ordered based on defined criteria. (*)     All other components within normal limits   COVID-19, RAPID   LIPASE   TROPONIN   TROPONIN       All other labs were within normal range or not returned

## 2024-11-28 NOTE — ED PROVIDER NOTES
[facility] Emergency Department  [address]  [place] [address]  Phone: [phone]  Fax: [phone]      Attending Physician Attestation    I performed a history and physical examination of the patient and discussed management with the mid level provider. I reviewed the mid level provider's note and agree with the documented findings and plan of care. Any areas of disagreement are noted on the chart. I was personally present for the key portions of any procedures. I have documented in the chart those procedures where I was not present during the key portions. I have reviewed the emergency nurses triage note. I agree with the chief complaint, past medical history, past surgical history, allergies, medications, social and family history as documented unless otherwise noted below. Documentation of the HPI, Physical Exam and Medical Decision Making performed by mid level providers is based on my personal performance of the HPI, PE and MDM. For Physician Assistant/ Nurse Practitioner cases/documentation I have personally evaluated this patient and have completed at least one if not all key elements of the E/M (history, physical exam, and MDM). Additional findings are as noted.      Chief Complaint   Patient presents with    Fatigue     X several days. Been sneezing a lot \"feels like a cold\"       INITIAL VITALS:  height is 1.499 m (4' 11\") and weight is 55.3 kg (122 lb). Her oral temperature is 98.3 °F (36.8 °C). Her blood pressure is 117/77 and her pulse is 63. Her respiration is 15 and oxygen saturation is 96%.       DIAGNOSTIC RESULTS       RADIOLOGY:   Non-plain film images such as CT, Ultrasound and MRI are read by the radiologist. Plain radiographic images are visualized and the radiologist interpretations are reviewed as follows:     XR CHEST PORTABLE   Final Result   No acute abnormality.               LABS:  Results for orders placed or performed during the hospital  encounter of 11/28/24   COVID-19, Rapid    Specimen: Nasopharyngeal Swab   Result Value Ref Range    Specimen Description .NASOPHARYNGEAL SWAB     SARS-CoV-2, Rapid Not Detected Not Detected   CBC with Auto Differential   Result Value Ref Range    WBC 6.2 3.5 - 11.0 k/uL    RBC 3.99 (L) 4.0 - 5.2 m/uL    Hemoglobin 11.9 (L) 12.0 - 16.0 g/dL    Hematocrit 35.9 (L) 36 - 46 %    MCV 90.1 80 - 100 fL    MCH 29.9 26 - 34 pg    MCHC 33.2 31 - 37 g/dL    RDW 13.7 12.5 - 15.4 %    Platelets 191 140 - 450 k/uL    MPV 8.0 6.0 - 12.0 fL    Neutrophils % 74 (H) 36 - 66 %    Lymphocytes % 14 (L) 24 - 44 %    Monocytes % 9 2 - 11 %    Eosinophils % 2 1 - 4 %    Basophils % 1 0 - 2 %    Neutrophils Absolute 4.60 1.8 - 7.7 k/uL    Lymphocytes Absolute 0.90 (L) 1.0 - 4.8 k/uL    Monocytes Absolute 0.60 0.1 - 1.2 k/uL    Eosinophils Absolute 0.10 0.0 - 0.4 k/uL    Basophils Absolute 0.00 0.0 - 0.2 k/uL   CMP   Result Value Ref Range    Sodium 141 135 - 144 mmol/L    Potassium 3.4 (L) 3.7 - 5.3 mmol/L    Chloride 104 98 - 107 mmol/L    CO2 28 20 - 31 mmol/L    Anion Gap 9 9 - 17 mmol/L    Glucose 119 (H) 70 - 99 mg/dL    BUN 19 8 - 23 mg/dL    Creatinine 1.1 (H) 0.5 - 0.9 mg/dL    Est, Glom Filt Rate 48 (L) >60 mL/min/1.73m2    Calcium 8.6 8.6 - 10.4 mg/dL    Total Protein 6.7 6.4 - 8.3 g/dL    Albumin 3.6 3.5 - 5.2 g/dL    Albumin/Globulin Ratio 1.2 1.0 - 2.5    Total Bilirubin 0.2 (L) 0.3 - 1.2 mg/dL    Alkaline Phosphatase 57 35 - 104 U/L    ALT 11 5 - 33 U/L    AST 13 <32 U/L   Lipase   Result Value Ref Range    Lipase 13 13 - 60 U/L   Troponin   Result Value Ref Range    Troponin, High Sensitivity 11 0 - 14 ng/L   Urinalysis with Reflex to Culture    Specimen: Urine   Result Value Ref Range    Color, UA Yellow Yellow    Turbidity UA Clear Clear    Glucose, Ur NEGATIVE NEGATIVE mg/dL    Bilirubin, Urine NEGATIVE NEGATIVE    Ketones, Urine TRACE (A) NEGATIVE mg/dL    Specific Gravity, UA 1.028 1.005 - 1.030    Urine Hgb SMALL (A)

## 2024-11-29 NOTE — DISCHARGE INSTRUCTIONS
Home.  Antibiotics as prescribed.  Return to the emergency department for any worsening symptoms, nausea vomiting, fatigue, fevers, not able to eat or drink, or any other concerns

## 2024-12-01 LAB
EKG ATRIAL RATE: 60 BPM
EKG P AXIS: 57 DEGREES
EKG P-R INTERVAL: 150 MS
EKG Q-T INTERVAL: 410 MS
EKG QRS DURATION: 74 MS
EKG QTC CALCULATION (BAZETT): 410 MS
EKG R AXIS: 28 DEGREES
EKG T AXIS: 59 DEGREES
EKG VENTRICULAR RATE: 60 BPM
MICROORGANISM SPEC CULT: ABNORMAL
MICROORGANISM SPEC CULT: ABNORMAL
SPECIMEN DESCRIPTION: ABNORMAL

## 2024-12-02 LAB
EKG ATRIAL RATE: 60 BPM
EKG P AXIS: 57 DEGREES
EKG P-R INTERVAL: 150 MS
EKG Q-T INTERVAL: 410 MS
EKG QRS DURATION: 74 MS
EKG QTC CALCULATION (BAZETT): 410 MS
EKG R AXIS: 28 DEGREES
EKG T AXIS: 59 DEGREES
EKG VENTRICULAR RATE: 60 BPM

## 2024-12-02 PROCEDURE — 93010 ELECTROCARDIOGRAM REPORT: CPT | Performed by: INTERNAL MEDICINE

## 2024-12-03 LAB
MICROORGANISM SPEC CULT: ABNORMAL
MICROORGANISM SPEC CULT: ABNORMAL
SPECIMEN DESCRIPTION: ABNORMAL

## 2024-12-03 NOTE — RESULT ENCOUNTER NOTE
Spoke with pt's dtr, Vickie, cipro 500 mg bid x [facility]  Daughter also instructed to have pt fiinished the keflex antibiotic she is currently taking.

## 2024-12-29 ENCOUNTER — APPOINTMENT (OUTPATIENT)
Dept: CT IMAGING | Age: 88
End: 2024-12-29
Payer: MEDICARE

## 2024-12-29 ENCOUNTER — HOSPITAL ENCOUNTER (INPATIENT)
Age: 88
LOS: 3 days | Discharge: HOME HEALTH | End: 2025-01-01
Attending: EMERGENCY MEDICINE | Admitting: STUDENT IN AN ORGANIZED HEALTH CARE EDUCATION/TRAINING PROGRAM
Payer: MEDICARE

## 2024-12-29 ENCOUNTER — APPOINTMENT (OUTPATIENT)
Dept: GENERAL RADIOLOGY | Age: 88
End: 2024-12-29
Payer: MEDICARE

## 2024-12-29 DIAGNOSIS — I50.9 CONGESTIVE HEART FAILURE, UNSPECIFIED HF CHRONICITY, UNSPECIFIED HEART FAILURE TYPE (HCC): Primary | ICD-10-CM

## 2024-12-29 PROBLEM — R13.14 PHARYNGOESOPHAGEAL PHASE DYSPHAGIA: Status: ACTIVE | Noted: 2023-10-02

## 2024-12-29 PROBLEM — R62.7 FAILURE TO THRIVE IN ADULT: Status: ACTIVE | Noted: 2024-09-11

## 2024-12-29 PROBLEM — G40.209 FOCAL EPILEPSY WITH IMPAIRMENT OF CONSCIOUSNESS (HCC): Status: ACTIVE | Noted: 2024-12-29

## 2024-12-29 PROBLEM — G91.9 HYDROCEPHALUS (HCC): Status: ACTIVE | Noted: 2023-04-26

## 2024-12-29 PROBLEM — F05 POST-ICTAL CONFUSION: Status: ACTIVE | Noted: 2024-12-29

## 2024-12-29 PROBLEM — I65.23 CAROTID STENOSIS, BILATERAL: Status: ACTIVE | Noted: 2020-07-31

## 2024-12-29 LAB
ALBUMIN SERPL-MCNC: 3.9 G/DL (ref 3.5–5.2)
ALBUMIN/GLOB SERPL: 1.1 {RATIO} (ref 1–2.5)
ALP SERPL-CCNC: 66 U/L (ref 35–104)
ALT SERPL-CCNC: 11 U/L (ref 5–33)
ANION GAP SERPL CALCULATED.3IONS-SCNC: 9 MMOL/L (ref 9–17)
AST SERPL-CCNC: 16 U/L
BACTERIA URNS QL MICRO: ABNORMAL
BASOPHILS # BLD: 0.1 K/UL (ref 0–0.2)
BASOPHILS NFR BLD: 1 % (ref 0–2)
BILIRUB SERPL-MCNC: 0.4 MG/DL (ref 0.3–1.2)
BILIRUB UR QL STRIP: NEGATIVE
BNP SERPL-MCNC: 1604 PG/ML
BUN SERPL-MCNC: 13 MG/DL (ref 8–23)
CALCIUM SERPL-MCNC: 9 MG/DL (ref 8.6–10.4)
CHARACTER UR: ABNORMAL
CHLORIDE SERPL-SCNC: 98 MMOL/L (ref 98–107)
CK SERPL-CCNC: 76 U/L (ref 26–192)
CLARITY UR: CLEAR
CO2 SERPL-SCNC: 28 MMOL/L (ref 20–31)
COLOR UR: YELLOW
CREAT SERPL-MCNC: 0.8 MG/DL (ref 0.5–0.9)
EOSINOPHIL # BLD: 0 K/UL (ref 0–0.4)
EOSINOPHILS RELATIVE PERCENT: 1 % (ref 1–4)
EPI CELLS #/AREA URNS HPF: ABNORMAL /HPF (ref 0–5)
ERYTHROCYTE [DISTWIDTH] IN BLOOD BY AUTOMATED COUNT: 13.3 % (ref 12.5–15.4)
FLUAV AG SPEC QL: NEGATIVE
FLUBV AG SPEC QL: NEGATIVE
GFR, ESTIMATED: 70 ML/MIN/1.73M2
GLUCOSE SERPL-MCNC: 102 MG/DL (ref 70–99)
GLUCOSE UR STRIP-MCNC: NEGATIVE MG/DL
HCT VFR BLD AUTO: 37.2 % (ref 36–46)
HGB BLD-MCNC: 12.5 G/DL (ref 12–16)
HGB UR QL STRIP.AUTO: ABNORMAL
KETONES UR STRIP-MCNC: ABNORMAL MG/DL
LEUKOCYTE ESTERASE UR QL STRIP: NEGATIVE
LEVETIRACETAM SERPL-MCNC: 3 UG/ML
LYMPHOCYTES NFR BLD: 1.2 K/UL (ref 1–4.8)
LYMPHOCYTES RELATIVE PERCENT: 15 % (ref 24–44)
MCH RBC QN AUTO: 29.7 PG (ref 26–34)
MCHC RBC AUTO-ENTMCNC: 33.7 G/DL (ref 31–37)
MCV RBC AUTO: 88.3 FL (ref 80–100)
MONOCYTES NFR BLD: 0.8 K/UL (ref 0.1–1.2)
MONOCYTES NFR BLD: 10 % (ref 2–11)
MYOGLOBIN SERPL-MCNC: 82 NG/ML (ref 25–58)
NEUTROPHILS NFR BLD: 73 % (ref 36–66)
NEUTS SEG NFR BLD: 5.8 K/UL (ref 1.8–7.7)
NITRITE UR QL STRIP: NEGATIVE
PH UR STRIP: 6.5 [PH] (ref 5–8)
PLATELET # BLD AUTO: 186 K/UL (ref 140–450)
PMV BLD AUTO: 7.9 FL (ref 6–12)
POTASSIUM SERPL-SCNC: 3.7 MMOL/L (ref 3.7–5.3)
PROT SERPL-MCNC: 7.6 G/DL (ref 6.4–8.3)
PROT UR STRIP-MCNC: NEGATIVE MG/DL
RBC # BLD AUTO: 4.21 M/UL (ref 4–5.2)
RBC #/AREA URNS HPF: ABNORMAL /HPF (ref 0–2)
SARS-COV-2 RDRP RESP QL NAA+PROBE: NOT DETECTED
SODIUM SERPL-SCNC: 135 MMOL/L (ref 135–144)
SP GR UR STRIP: 1.02 (ref 1–1.03)
SPECIMEN DESCRIPTION: NORMAL
TROPONIN I SERPL HS-MCNC: 15 NG/L (ref 0–14)
TROPONIN I SERPL HS-MCNC: 16 NG/L (ref 0–14)
UROBILINOGEN UR STRIP-ACNC: NORMAL EU/DL (ref 0–1)
WBC #/AREA URNS HPF: ABNORMAL /HPF (ref 0–5)
WBC OTHER # BLD: 7.9 K/UL (ref 3.5–11)

## 2024-12-29 PROCEDURE — 83874 ASSAY OF MYOGLOBIN: CPT

## 2024-12-29 PROCEDURE — 70498 CT ANGIOGRAPHY NECK: CPT

## 2024-12-29 PROCEDURE — 72125 CT NECK SPINE W/O DYE: CPT

## 2024-12-29 PROCEDURE — 82550 ASSAY OF CK (CPK): CPT

## 2024-12-29 PROCEDURE — 6370000000 HC RX 637 (ALT 250 FOR IP): Performed by: STUDENT IN AN ORGANIZED HEALTH CARE EDUCATION/TRAINING PROGRAM

## 2024-12-29 PROCEDURE — 80053 COMPREHEN METABOLIC PANEL: CPT

## 2024-12-29 PROCEDURE — 80177 DRUG SCRN QUAN LEVETIRACETAM: CPT

## 2024-12-29 PROCEDURE — 2500000003 HC RX 250 WO HCPCS: Performed by: STUDENT IN AN ORGANIZED HEALTH CARE EDUCATION/TRAINING PROGRAM

## 2024-12-29 PROCEDURE — 84484 ASSAY OF TROPONIN QUANT: CPT

## 2024-12-29 PROCEDURE — 93005 ELECTROCARDIOGRAM TRACING: CPT | Performed by: STUDENT IN AN ORGANIZED HEALTH CARE EDUCATION/TRAINING PROGRAM

## 2024-12-29 PROCEDURE — 6360000002 HC RX W HCPCS: Performed by: NURSE PRACTITIONER

## 2024-12-29 PROCEDURE — 87635 SARS-COV-2 COVID-19 AMP PRB: CPT

## 2024-12-29 PROCEDURE — 70450 CT HEAD/BRAIN W/O DYE: CPT

## 2024-12-29 PROCEDURE — 6360000004 HC RX CONTRAST MEDICATION: Performed by: STUDENT IN AN ORGANIZED HEALTH CARE EDUCATION/TRAINING PROGRAM

## 2024-12-29 PROCEDURE — 81001 URINALYSIS AUTO W/SCOPE: CPT

## 2024-12-29 PROCEDURE — 87804 INFLUENZA ASSAY W/OPTIC: CPT

## 2024-12-29 PROCEDURE — 71045 X-RAY EXAM CHEST 1 VIEW: CPT

## 2024-12-29 PROCEDURE — 99222 1ST HOSP IP/OBS MODERATE 55: CPT | Performed by: STUDENT IN AN ORGANIZED HEALTH CARE EDUCATION/TRAINING PROGRAM

## 2024-12-29 PROCEDURE — 99285 EMERGENCY DEPT VISIT HI MDM: CPT

## 2024-12-29 PROCEDURE — 83880 ASSAY OF NATRIURETIC PEPTIDE: CPT

## 2024-12-29 PROCEDURE — 85025 COMPLETE CBC W/AUTO DIFF WBC: CPT

## 2024-12-29 PROCEDURE — 1200000000 HC SEMI PRIVATE

## 2024-12-29 RX ORDER — CARVEDILOL 25 MG/1
25 TABLET ORAL 2 TIMES DAILY WITH MEALS
Status: ON HOLD | COMMUNITY
End: 2024-12-30 | Stop reason: HOSPADM

## 2024-12-29 RX ORDER — SODIUM CHLORIDE 0.9 % (FLUSH) 0.9 %
5-40 SYRINGE (ML) INJECTION PRN
Status: DISCONTINUED | OUTPATIENT
Start: 2024-12-29 | End: 2025-01-01 | Stop reason: HOSPADM

## 2024-12-29 RX ORDER — SODIUM CHLORIDE 9 MG/ML
INJECTION, SOLUTION INTRAVENOUS PRN
Status: DISCONTINUED | OUTPATIENT
Start: 2024-12-29 | End: 2025-01-01 | Stop reason: HOSPADM

## 2024-12-29 RX ORDER — FUROSEMIDE 10 MG/ML
20 INJECTION INTRAMUSCULAR; INTRAVENOUS ONCE
Status: COMPLETED | OUTPATIENT
Start: 2024-12-29 | End: 2024-12-29

## 2024-12-29 RX ORDER — TRAZODONE HYDROCHLORIDE 50 MG/1
100 TABLET, FILM COATED ORAL NIGHTLY
Status: DISCONTINUED | OUTPATIENT
Start: 2024-12-29 | End: 2025-01-01 | Stop reason: HOSPADM

## 2024-12-29 RX ORDER — ONDANSETRON 4 MG/1
4 TABLET, ORALLY DISINTEGRATING ORAL EVERY 8 HOURS PRN
Status: DISCONTINUED | OUTPATIENT
Start: 2024-12-29 | End: 2025-01-01 | Stop reason: HOSPADM

## 2024-12-29 RX ORDER — POTASSIUM CHLORIDE 1500 MG/1
40 TABLET, EXTENDED RELEASE ORAL PRN
Status: DISCONTINUED | OUTPATIENT
Start: 2024-12-29 | End: 2025-01-01 | Stop reason: HOSPADM

## 2024-12-29 RX ORDER — 0.9 % SODIUM CHLORIDE 0.9 %
80 INTRAVENOUS SOLUTION INTRAVENOUS ONCE
Status: DISCONTINUED | OUTPATIENT
Start: 2024-12-29 | End: 2025-01-01 | Stop reason: HOSPADM

## 2024-12-29 RX ORDER — IOPAMIDOL 755 MG/ML
75 INJECTION, SOLUTION INTRAVASCULAR
Status: COMPLETED | OUTPATIENT
Start: 2024-12-29 | End: 2024-12-29

## 2024-12-29 RX ORDER — ENOXAPARIN SODIUM 100 MG/ML
40 INJECTION SUBCUTANEOUS DAILY
Status: DISCONTINUED | OUTPATIENT
Start: 2024-12-29 | End: 2025-01-01 | Stop reason: HOSPADM

## 2024-12-29 RX ORDER — CARVEDILOL 12.5 MG/1
25 TABLET ORAL 2 TIMES DAILY
Status: DISCONTINUED | OUTPATIENT
Start: 2024-12-29 | End: 2024-12-30

## 2024-12-29 RX ORDER — SODIUM CHLORIDE 0.9 % (FLUSH) 0.9 %
5-40 SYRINGE (ML) INJECTION EVERY 12 HOURS SCHEDULED
Status: DISCONTINUED | OUTPATIENT
Start: 2024-12-29 | End: 2025-01-01 | Stop reason: HOSPADM

## 2024-12-29 RX ORDER — ONDANSETRON 2 MG/ML
4 INJECTION INTRAMUSCULAR; INTRAVENOUS EVERY 6 HOURS PRN
Status: DISCONTINUED | OUTPATIENT
Start: 2024-12-29 | End: 2025-01-01 | Stop reason: HOSPADM

## 2024-12-29 RX ORDER — POTASSIUM CHLORIDE 7.45 MG/ML
10 INJECTION INTRAVENOUS PRN
Status: DISCONTINUED | OUTPATIENT
Start: 2024-12-29 | End: 2025-01-01 | Stop reason: HOSPADM

## 2024-12-29 RX ORDER — PANTOPRAZOLE SODIUM 40 MG/1
40 TABLET, DELAYED RELEASE ORAL
Status: DISCONTINUED | OUTPATIENT
Start: 2024-12-30 | End: 2025-01-01 | Stop reason: HOSPADM

## 2024-12-29 RX ORDER — MAGNESIUM SULFATE IN WATER 40 MG/ML
2000 INJECTION, SOLUTION INTRAVENOUS PRN
Status: DISCONTINUED | OUTPATIENT
Start: 2024-12-29 | End: 2025-01-01 | Stop reason: HOSPADM

## 2024-12-29 RX ORDER — LANSOPRAZOLE
30 KIT
Status: DISCONTINUED | OUTPATIENT
Start: 2024-12-30 | End: 2024-12-29

## 2024-12-29 RX ORDER — ACETAMINOPHEN 650 MG/1
650 SUPPOSITORY RECTAL EVERY 6 HOURS PRN
Status: DISCONTINUED | OUTPATIENT
Start: 2024-12-29 | End: 2025-01-01 | Stop reason: HOSPADM

## 2024-12-29 RX ORDER — LEVETIRACETAM 250 MG/1
250 TABLET ORAL 2 TIMES DAILY
Status: DISCONTINUED | OUTPATIENT
Start: 2024-12-29 | End: 2024-12-30

## 2024-12-29 RX ORDER — ACETAMINOPHEN 325 MG/1
650 TABLET ORAL EVERY 6 HOURS PRN
Status: DISCONTINUED | OUTPATIENT
Start: 2024-12-29 | End: 2025-01-01 | Stop reason: HOSPADM

## 2024-12-29 RX ORDER — CARVEDILOL 3.12 MG/1
25 TABLET ORAL DAILY
COMMUNITY
End: 2024-12-29

## 2024-12-29 RX ORDER — SODIUM CHLORIDE 0.9 % (FLUSH) 0.9 %
10 SYRINGE (ML) INJECTION PRN
Status: DISCONTINUED | OUTPATIENT
Start: 2024-12-29 | End: 2025-01-01 | Stop reason: HOSPADM

## 2024-12-29 RX ORDER — CARVEDILOL 3.12 MG/1
3.12 TABLET ORAL
Status: COMPLETED | OUTPATIENT
Start: 2024-12-29 | End: 2024-12-29

## 2024-12-29 RX ORDER — CIPROFLOXACIN 250 MG/1
500 TABLET, FILM COATED ORAL EVERY 12 HOURS SCHEDULED
Status: DISCONTINUED | OUTPATIENT
Start: 2024-12-29 | End: 2024-12-30

## 2024-12-29 RX ADMIN — SODIUM CHLORIDE, PRESERVATIVE FREE 10 ML: 5 INJECTION INTRAVENOUS at 22:31

## 2024-12-29 RX ADMIN — CIPROFLOXACIN HYDROCHLORIDE 500 MG: 250 TABLET, FILM COATED ORAL at 22:29

## 2024-12-29 RX ADMIN — FUROSEMIDE 20 MG: 10 INJECTION, SOLUTION INTRAMUSCULAR; INTRAVENOUS at 14:43

## 2024-12-29 RX ADMIN — SODIUM CHLORIDE, PRESERVATIVE FREE 10 ML: 5 INJECTION INTRAVENOUS at 17:45

## 2024-12-29 RX ADMIN — IOPAMIDOL 75 ML: 755 INJECTION, SOLUTION INTRAVENOUS at 17:44

## 2024-12-29 RX ADMIN — TRAZODONE HYDROCHLORIDE 100 MG: 50 TABLET ORAL at 22:30

## 2024-12-29 RX ADMIN — LEVETIRACETAM 250 MG: 250 TABLET, FILM COATED ORAL at 22:30

## 2024-12-29 RX ADMIN — CARVEDILOL 3.12 MG: 3.12 TABLET, FILM COATED ORAL at 15:10

## 2024-12-29 RX ADMIN — Medication 80 ML: at 17:44

## 2024-12-29 RX ADMIN — CARVEDILOL 25 MG: 12.5 TABLET, FILM COATED ORAL at 22:30

## 2024-12-29 ASSESSMENT — PAIN - FUNCTIONAL ASSESSMENT: PAIN_FUNCTIONAL_ASSESSMENT: NONE - DENIES PAIN

## 2024-12-29 NOTE — ED PROVIDER NOTES
[facility] Emergency Department  [address]  [place] [address]  Phone: [phone]  Fax: [phone]      Attending Physician Attestation    I performed a history and physical examination of the patient and discussed management with the mid level provideer. I reviewed the mid level provider's note and agree with the documented findings and plan of care. Any areas of disagreement are noted on the chart. I was personally present for the key portions of any procedures. I have documented in the chart those procedures where I was not present during the key portions. I have reviewed the emergency nurses triage note. I agree with the chief complaint, past medical history, past surgical history, allergies, medications, social and family history as documented unless otherwise noted below. Documentation of the HPI, Physical Exam and Medical Decision Making performed by mid level providers is based on my personal performance of the HPI, PE and MDM. For Physician Assistant/ Nurse Practitioner cases/documentation I have personally evaluated this patient and have completed at least one if not all key elements of the E/M (history, physical exam, and MDM). Additional findings are as noted.      CHIEF COMPLAINT       Chief Complaint   Patient presents with    Fall     Patient arrives to ED with complaints of a fall. Patient lives with daughter and had a unwitnessed fall aprox 4-5am this morning. Patient was roughly on the ground 5 hours before found. Patient daughter states she found her on the floor in the bathroom. 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 Patient does not recall much of fall. Daughter states last fall was 2-3 months ago.     Altered Mental Status     Patient daughter reports alerted mental status this morning. Patient is currently alert and oriented x3 and able to make needs known.          PAST MEDICAL HISTORY    has a past medical history of Arthritis,

## 2024-12-29 NOTE — ED PROVIDER NOTES
is no acute intracranial hemorrhage, mass effect or midline shift.  No abnormal extra-axial fluid collection.  No evidence of an acute infarct. There is crowding of the sulci at the vertex and narrowing of the close all angle to 74 degrees of the level of the posterior commissure. Moderate global parenchymal volume loss and periventricular white matter hypodensity that most commonly relates to microvascular ischemic disease ORBITS: The visualized portion of the orbits demonstrate no acute abnormality. SINUSES: The visualized paranasal sinuses and mastoid air cells demonstrate no acute abnormality. SOFT TISSUES/SKULL:  No acute abnormality of the visualized skull or soft tissues. CERVICAL SPINE BONES/ALIGNMENT: There is no acute fracture or traumatic malalignment.  Retro odontoid pannus erodes into the dens causing mild narrowing at the craniocervical junction. DEGENERATIVE CHANGES: Multilevel degenerative changes of the cervical spine are most pronounced at C3-C4 where there is mild bilateral neural foraminal stenosis and spinal canal stenosis. SOFT TISSUES: There is no prevertebral soft tissue swelling.     1.  No acute intracranial abnormality. 2.  No acute fracture in the cervical spine. 3.  Findings of normal pressure hydrocephalus. 4.  Chronic appearing erosion of the dens may relate to crystalline or inflammatory arthropathy.     Reviewing the patient's medical chart, she had CT head and October 2023 with similar findings of normal pressure hydrocephalus.    Updated the patient and the patient's daughter on the diagnostic test finding, plan is for admission, the patient has no history of congestive heart failure, she had an order for 2D echo 2021, I am unable to see the results from this echo, reach out and spoke with the hospitalist provider Dr. Barton, updated on HPI, diagnostic test findings, treatment modalities in the emergency department, pending second high-sensitivity troponin is agreeable for  admission; Keppra level was sent as well.    Repeat high-sensitivity troponin returns flat at 16.    Amount and/or Complexity of Data Reviewed  Labs: ordered.  Radiology: ordered.    Risk  Prescription drug management.  Decision regarding hospitalization.            REASSESSMENT          CRITICAL CARE TIME   Total Critical Care time was  minutes, excluding separately reportable procedures.  There was a high probability of clinically significant/life threatening deterioration in the patient's condition which required my urgent intervention.      CONSULTS:  IP CONSULT TO SOCIAL WORK  IP CONSULT TO NEUROLOGY    PROCEDURES:  Unless otherwise noted below, none     Procedures        FINAL IMPRESSION      1. Congestive heart failure, unspecified HF chronicity, unspecified heart failure type (HCC)          DISPOSITION/PLAN   DISPOSITION Admitted 12/29/2024 02:42:58 PM               PATIENT REFERRED TO:  No follow-up provider specified.    DISCHARGE MEDICATIONS:  New Prescriptions    No medications on file     Controlled Substances Monitoring:          No data to display                (Please note that portions of this note were completed with a voice recognition program.  Efforts were made to edit the dictations but occasionally words are mis-transcribed.)    ULISES Olivo CNP (electronically signed)           Ariana Hernandez APRN - CNP  12/29/24 [id number]

## 2024-12-29 NOTE — H&P
[facility]  Office: [phone]  Redd Barton DO, Sourav Ortega DO, Jero Villagran DO, Xu Mathew DO, Maris Guillermo MD, Marie Prabhakar MD, Sabine Lazar MD, Rebecca Moore MD,  Murray Marquez MD, Tanmay Toledo MD, Colton Joshi MD,  Tatianna Hoffmann DO, Grey Moralez MD, Vadim Mathew MD, Art Barton DO, Tanika Naranjo MD,  Kvng Herring DO, Itzel Painting MD, Shirlene Maldonado MD, Idalia Ontiveros MD, Andria Nation MD,  Harpal Ramon MD, Erick Ventura MD, David Ball MD, Nelia Kelley MD, Roger Styles MD, Jayant Allison MD, Rashard Mclaughlin DO, Vladimir Donahue MD, Tatianna Dougherty MD, Mohsin Reza, MD, Shirley Waterhouse, CNP,  Azra Esparza CNP, Rashard Santa, CNP,  Ashanti Lopez, GERARDO, Mindy Inman, CNP, Sammie Multani, CNP, Dorita Denny, CNP, Beatriz Bautista, CNP, Sugey Gonzalez, PA-C, Josette Gomez PA-C, Kim Castorena, CNP, Blanca Resendez, CNP,  Massiel Dias, CNP, Alma Delia Soler, CNP, Abril Brooks, CNP,  Franny Santiago, CNP, Latonia Chowdary, CNP         [facility]   IN-PATIENT SERVICE   [facility]    HISTORY AND PHYSICAL EXAMINATION            Date:   12/29/2024  Patient name:  Nargis Martin  Date of admission:  12/29/2024 10:49 AM  MRN:   [id number]  Account:  [id number]  YOB: 1935  PCP:    Joaquim Abdullahi MD  Room:   ER10/ER10  Code Status:    No Order    Chief Complaint:     Chief Complaint   Patient presents with    Fall     Patient arrives to ED with complaints of a fall. Patient lives with daughter and had a unwitnessed fall aprox 4-5am this morning. Patient was roughly on the ground 5 hours before found. Patient daughter states she found her on the floor in the bathroom. 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 Patient does not recall much of fall. Daughter states last fall was 2-3 months ago.     Altered Mental Status     Patient daughter reports alerted mental status this morning.

## 2024-12-30 ENCOUNTER — APPOINTMENT (OUTPATIENT)
Dept: MRI IMAGING | Age: 88
End: 2024-12-30
Payer: MEDICARE

## 2024-12-30 PROBLEM — I50.9 CONGESTIVE HEART FAILURE (HCC): Status: ACTIVE | Noted: 2024-12-30

## 2024-12-30 LAB
ANION GAP SERPL CALCULATED.3IONS-SCNC: 12 MMOL/L (ref 9–17)
BASOPHILS # BLD: 0.1 K/UL (ref 0–0.2)
BASOPHILS NFR BLD: 1 % (ref 0–2)
BUN SERPL-MCNC: 14 MG/DL (ref 8–23)
CALCIUM SERPL-MCNC: 8.4 MG/DL (ref 8.6–10.4)
CHLORIDE SERPL-SCNC: 96 MMOL/L (ref 98–107)
CO2 SERPL-SCNC: 27 MMOL/L (ref 20–31)
CREAT SERPL-MCNC: 0.9 MG/DL (ref 0.5–0.9)
EOSINOPHIL # BLD: 0 K/UL (ref 0–0.4)
EOSINOPHILS RELATIVE PERCENT: 0 % (ref 1–4)
ERYTHROCYTE [DISTWIDTH] IN BLOOD BY AUTOMATED COUNT: 13.3 % (ref 12.5–15.4)
GFR, ESTIMATED: 61 ML/MIN/1.73M2
GLUCOSE SERPL-MCNC: 125 MG/DL (ref 70–99)
HCT VFR BLD AUTO: 34.5 % (ref 36–46)
HGB BLD-MCNC: 12 G/DL (ref 12–16)
LYMPHOCYTES NFR BLD: 1.1 K/UL (ref 1–4.8)
LYMPHOCYTES RELATIVE PERCENT: 12 % (ref 24–44)
MAGNESIUM SERPL-MCNC: 1.9 MG/DL (ref 1.6–2.6)
MCH RBC QN AUTO: 30.7 PG (ref 26–34)
MCHC RBC AUTO-ENTMCNC: 34.8 G/DL (ref 31–37)
MCV RBC AUTO: 88.2 FL (ref 80–100)
MONOCYTES NFR BLD: 1 K/UL (ref 0.1–1.2)
MONOCYTES NFR BLD: 11 % (ref 2–11)
NEUTROPHILS NFR BLD: 76 % (ref 36–66)
NEUTS SEG NFR BLD: 6.6 K/UL (ref 1.8–7.7)
PLATELET # BLD AUTO: 168 K/UL (ref 140–450)
PMV BLD AUTO: 7.8 FL (ref 6–12)
POTASSIUM SERPL-SCNC: 3.1 MMOL/L (ref 3.7–5.3)
RBC # BLD AUTO: 3.91 M/UL (ref 4–5.2)
SODIUM SERPL-SCNC: 135 MMOL/L (ref 135–144)
WBC OTHER # BLD: 8.7 K/UL (ref 3.5–11)

## 2024-12-30 PROCEDURE — 83735 ASSAY OF MAGNESIUM: CPT

## 2024-12-30 PROCEDURE — 2500000003 HC RX 250 WO HCPCS: Performed by: STUDENT IN AN ORGANIZED HEALTH CARE EDUCATION/TRAINING PROGRAM

## 2024-12-30 PROCEDURE — 97162 PT EVAL MOD COMPLEX 30 MIN: CPT

## 2024-12-30 PROCEDURE — 85025 COMPLETE CBC W/AUTO DIFF WBC: CPT

## 2024-12-30 PROCEDURE — 36415 COLL VENOUS BLD VENIPUNCTURE: CPT

## 2024-12-30 PROCEDURE — 95816 EEG AWAKE AND DROWSY: CPT | Performed by: PSYCHIATRY & NEUROLOGY

## 2024-12-30 PROCEDURE — 70551 MRI BRAIN STEM W/O DYE: CPT

## 2024-12-30 PROCEDURE — 0202U NFCT DS 22 TRGT SARS-COV-2: CPT

## 2024-12-30 PROCEDURE — 95816 EEG AWAKE AND DROWSY: CPT

## 2024-12-30 PROCEDURE — 6370000000 HC RX 637 (ALT 250 FOR IP): Performed by: STUDENT IN AN ORGANIZED HEALTH CARE EDUCATION/TRAINING PROGRAM

## 2024-12-30 PROCEDURE — 80048 BASIC METABOLIC PNL TOTAL CA: CPT

## 2024-12-30 PROCEDURE — 4A10X4Z MONITORING OF CENTRAL NERVOUS ELECTRICAL ACTIVITY, EXTERNAL APPROACH: ICD-10-PCS | Performed by: STUDENT IN AN ORGANIZED HEALTH CARE EDUCATION/TRAINING PROGRAM

## 2024-12-30 PROCEDURE — 1200000000 HC SEMI PRIVATE

## 2024-12-30 PROCEDURE — 99232 SBSQ HOSP IP/OBS MODERATE 35: CPT | Performed by: STUDENT IN AN ORGANIZED HEALTH CARE EDUCATION/TRAINING PROGRAM

## 2024-12-30 PROCEDURE — 97535 SELF CARE MNGMENT TRAINING: CPT

## 2024-12-30 PROCEDURE — 99222 1ST HOSP IP/OBS MODERATE 55: CPT | Performed by: STUDENT IN AN ORGANIZED HEALTH CARE EDUCATION/TRAINING PROGRAM

## 2024-12-30 PROCEDURE — 6360000002 HC RX W HCPCS: Performed by: STUDENT IN AN ORGANIZED HEALTH CARE EDUCATION/TRAINING PROGRAM

## 2024-12-30 PROCEDURE — 97116 GAIT TRAINING THERAPY: CPT

## 2024-12-30 PROCEDURE — 97166 OT EVAL MOD COMPLEX 45 MIN: CPT

## 2024-12-30 PROCEDURE — 97530 THERAPEUTIC ACTIVITIES: CPT

## 2024-12-30 RX ORDER — LEVETIRACETAM 500 MG/1
500 TABLET, FILM COATED, EXTENDED RELEASE ORAL DAILY
Qty: 30 TABLET | Refills: 5 | Status: SHIPPED | OUTPATIENT
Start: 2024-12-30 | End: 2025-01-29

## 2024-12-30 RX ORDER — LEVETIRACETAM 500 MG/1
500 TABLET, FILM COATED, EXTENDED RELEASE ORAL DAILY
Qty: 30 TABLET | Refills: 0 | Status: SHIPPED | OUTPATIENT
Start: 2024-12-30 | End: 2024-12-30

## 2024-12-30 RX ORDER — LEVOFLOXACIN 750 MG/1
750 TABLET, FILM COATED ORAL EVERY OTHER DAY
Status: DISCONTINUED | OUTPATIENT
Start: 2024-12-30 | End: 2025-01-01 | Stop reason: HOSPADM

## 2024-12-30 RX ORDER — LEVETIRACETAM 100 MG/ML
250 SOLUTION ORAL 2 TIMES DAILY
Status: DISCONTINUED | OUTPATIENT
Start: 2024-12-30 | End: 2025-01-01 | Stop reason: HOSPADM

## 2024-12-30 RX ORDER — CARVEDILOL 3.12 MG/1
3.12 TABLET ORAL 2 TIMES DAILY WITH MEALS
Status: DISCONTINUED | OUTPATIENT
Start: 2024-12-30 | End: 2025-01-01 | Stop reason: HOSPADM

## 2024-12-30 RX ADMIN — LEVOFLOXACIN 750 MG: 750 TABLET, FILM COATED ORAL at 10:04

## 2024-12-30 RX ADMIN — SODIUM CHLORIDE, PRESERVATIVE FREE 10 ML: 5 INJECTION INTRAVENOUS at 20:16

## 2024-12-30 RX ADMIN — ENOXAPARIN SODIUM 40 MG: 100 INJECTION SUBCUTANEOUS at 10:04

## 2024-12-30 RX ADMIN — SODIUM CHLORIDE, PRESERVATIVE FREE 10 ML: 5 INJECTION INTRAVENOUS at 10:04

## 2024-12-30 RX ADMIN — LEVETIRACETAM 250 MG: 500 SOLUTION ORAL at 20:42

## 2024-12-30 RX ADMIN — TRAZODONE HYDROCHLORIDE 100 MG: 50 TABLET ORAL at 20:16

## 2024-12-30 RX ADMIN — POTASSIUM BICARBONATE 40 MEQ: 782 TABLET, EFFERVESCENT ORAL at 20:16

## 2024-12-30 RX ADMIN — LEVETIRACETAM 250 MG: 250 TABLET, FILM COATED ORAL at 10:04

## 2024-12-30 RX ADMIN — PANTOPRAZOLE SODIUM 40 MG: 40 TABLET, DELAYED RELEASE ORAL at 06:29

## 2024-12-30 NOTE — PROGRESS NOTES
[facility]  Office: [phone]  Redd Barton DO, Sourav Oretga DO, Jero Villagran DO, Xu Mathew DO, Maris Guillermo MD, Marie Prabhakar MD, Sabine Lazar MD, Rebecca Moore MD,  Murray Maruqez MD, Tanmay Toledo MD, Colton Joshi MD,  Tatianna Hoffmann DO, Grey Moralez MD, Vadim Mathew MD, Art Barton DO, Tanika Naranjo MD,  Kvng Herring DO, Itzel Painting MD, Shirlene Maldonado MD, Idalia Ontiveros MD, Andria Nation MD,  Harpal Ramon MD, Erick Ventura MD, David Ball MD, Nelia Kelley MD, Roger Styles MD, Jayant Allison MD, Rashard Mclaughlin DO, Vladimir Donahue MD, Tatianna Dougherty MD, Mohsin Reza, MD, Shirley Waterhouse, CNP,  Azra Esparza CNP, Rashard Santa, CNP,  Ashanti Lopez, GERARDO, Mindy Inman, CNP, Sammie Multani, CNP, Dorita Denny, CNP, Beatriz Bautista, CNP, Sugey Gonzalez, PA-C, Josette Gomez PA-C, Kim Castorena, CNP, Blanca Resendez, CNP,  Massiel Dias, CNP, Alma Delia Soler, CNP, Abril Brooks, CNP,  Franny Santiago, CNP, Latonia Chowdary, CNP         [facility]   IN-PATIENT SERVICE   [facility]    Progress Note    12/30/2024    11:09 AM    Name:   Nargis Martin  MRN:     [id number]     Acct:      [id number]   Room:   [address] [place] [address] Day:  1  Admit Date:  12/29/2024 10:49 AM    PCP:   Joaquim Abdullahi MD  Code Status:  DNR-CCA    Subjective:     C/C:   Chief Complaint   Patient presents with    Fall     Patient arrives to ED with complaints of a fall. Patient lives with daughter and had a unwitnessed fall aprox 4-5am this morning. Patient was roughly on the ground 5 hours before found. Patient daughter states she found her on the floor in the bathroom. 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 Patient does not recall much of fall. Daughter states last fall was 2-3 months ago.     Altered Mental Status     Patient daughter reports alerted mental status this morning. Patient is currently alert and oriented x3 and

## 2024-12-30 NOTE — CONSULTS
[facility] Neurology   IN-PATIENT SERVICE   [facility]    Inpatient Neurology Consult Note             Date:   12/30/2024  Patient name:  Nargis Martin  Date of admission:  12/29/2024 10:49 AM  MRN:   [id number]  Account:  [id number]  YOB: 1935  PCP:    Joaquim Abdullahi MD  Room:   [address] [place] [address]  Code Status:    DNR-CCA    Chief Complaint:     Chief Complaint   Patient presents with    Fall     Patient arrives to ED with complaints of a fall. Patient lives with daughter and had a unwitnessed fall aprox 4-5am this morning. Patient was roughly on the ground 5 hours before found. Patient daughter states she found her on the floor in the bathroom. 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 Patient does not recall much of fall. Daughter states last fall was 2-3 months ago.     Altered Mental Status     Patient daughter reports alerted mental status this morning. Patient is currently alert and oriented x3 and able to make needs known.    Neurology Consulted 12/29/24 evening for concern of Seizure like activity     History Obtained From:   patient, electronic medical record  History of Present Illness:   The patient is a 89 y.o.   female who presents to [place] ER 12/29/24 from home after being found down confused in the bathroom with unwitnessed fall and about 5 hours downtime before found by patients daughter whom she lives with. PMH significant for HTN, DM2, COPD, GERD and OA. History obtained primarily by patients daughter Vickie at bedside. She states that her mother has had progressive ambulatory difficulty well described as magnetic gait and recurrent falls for about the last 3 years using a gallardo 3 years ago than do to increasing instability required rolling walker starting 2 years ago however in the last 1 month patient required transition to wheel chair and near non ambulatory. 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 Per patient daughter she never refused VPS rather it was never offered as an option to patient as it was thought it would not be helpful however seems since diagnosis in 2021 patient went from walking with a can and normal mentation to now wheel chair dependent and increasing memory difficulty and dementia.     Acute metabolic encephalopathy  -Pneumonia  -Agree with medical therapy and management per primary medicine team  -started on oral levaquin     PT/OT  CM-SNF vs home with home health and outpatient therapy. Family seems strongly against any SNF placement at this time     No further inpatient neurology workup planned at this time and have placed Keppra 500mg ER once daily in discharge orders and follow up with her primary neurologist.     Consultations:   IP CONSULT TO SOCIAL WORK  IP CONSULT TO NEUROLOGY        The plan was discussed with the patient, patient's family and the medical staff.      Patient is admitted as inpatient status because of co-morbidities listed above, severity of signs and symptoms as outlined, requirement for current medical therapies and most importantly because of direct risk to patient if care not provided in a hospital setting.    Jackson Mortensen MD  12/30/2024  6:28 AM    Copy sent to Joaquim Montgomery MD

## 2024-12-30 NOTE — PROGRESS NOTES
Physical Therapy  Facility/Department: [address]   Physical Therapy Initial Evaluation    Patient Name: Nargis Martin        MRN: [id number]    : 1935    Date of Service: 2024    Chief Complaint   Patient presents with    Fall     Patient arrives to ED with complaints of a fall. Patient lives with daughter and had a unwitnessed fall aprox 4-5am this morning. Patient was roughly on the ground 5 hours before found. Patient daughter states she found her on the floor in the bathroom. 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 Patient does not recall much of fall. Daughter states last fall was 2-3 months ago.     Altered Mental Status     Patient daughter reports alerted mental status this morning. Patient is currently alert and oriented x3 and able to make needs known.      Past Medical History:  has a past medical history of Arthritis, COPD (chronic obstructive pulmonary disease) ([facility]), Diabetes mellitus (HCC), GERD (gastroesophageal reflux disease), and Hypertension.  Past Surgical History:  has a past surgical history that includes joint replacement (Right); Appendectomy; Ovary removal (Right); Carpal tunnel release (Right); and Wrist surgery (Left, 6-25-15).    Discharge Recommendations  Discharge Recommendations: Patient would benefit from continued therapy after discharge  PT Equipment Recommendations  Equipment Needed: No (4ww at home)    Assessment  Body Structures, Functions, Activity Limitations Requiring Skilled Therapeutic Intervention: Decreased functional mobility , Decreased cognition, Decreased endurance, Decreased ADL status, Decreased posture, Decreased balance, Decreased safe awareness, Decreased strength    Assessment: Pt presents after unwitnessed fall at home, on ground x 5 hours, (+) UTI. At baseline, pt lives in specially built area off of daughter's house, ind gait with 4ww, ind ADL's. Pt currently requiring mod assist x 1 supine to sit and max

## 2024-12-30 NOTE — CARE COORDINATION
Patient having EEG completed when CM attempted to see for transitional planning. Initial assessment to be completed at a later time.    Case Management Assessment  Initial Evaluation    Date/Time of Evaluation: 12/30/2024 2:57 PM  Assessment Completed by: Mercy Gurrola RN    If patient is discharged prior to next notation, then this note serves as note for discharge by case management.    Patient Name: Nargis Martin                   YOB: 1935  Diagnosis: Post-ictal confusion [F05]  Congestive heart failure, unspecified HF chronicity, unspecified heart failure type (HCC) [I50.9]                   Date / Time: 12/29/2024 10:49 AM    Patient Admission Status: Inpatient   Readmission Risk (Low < 19, Mod (19-27), High > 27): Readmission Risk Score: 15.6    Current PCP: Joaquim Abdullahi MD  PCP verified by CM? Yes    Chart Reviewed: Yes      History Provided by: Patient, Child/Family (Daughter- Vickie Singletary)  Patient Orientation: Alert and Oriented    Patient Cognition: Alert    Hospitalization in the last 30 days (Readmission):  No    If yes, Readmission Assessment in  Navigator will be completed.    Advance Directives:      Code Status: DNR-CCA   Patient's Primary Decision Maker is: Legal Next of Kin      Discharge Planning:    Patient lives with: Children Type of Home: House  Primary Care Giver:    Patient Support Systems include: Children   Current Financial resources: Medicare  Current community resources: None  Current services prior to admission: None            Current DME:              Type of Home Care services:  None    ADLS  Prior functional level: Assistance with the following:, Shopping, Housework, Cooking  Current functional level: Assistance with the following:, Shopping, Housework, Cooking, Bathing, Mobility    PT AM-PAC: 11 /24  OT AM-PAC: 12 /24    Family can provide assistance at DC: Yes  Would you like Case Management to discuss the discharge plan with any other family

## 2024-12-30 NOTE — PROCEDURES
PROCEDURE NOTE  Date: 12/30/2024   Name: Nargis Martin  YOB: 1935    Procedures            Date: 12/30/2024  Referring physician: Dr. Mortensen    Indication  Patient aged 89 y with encephalopathy. EEG done to assess for epileptiform activity.    Introduction  This routine 31-minute EEG was recorded using the International 10-20 System on a [employer] workstation at 256 samples/s. Automated spike and seizure detection algorithms were applied.    Description  During the maximal alert state, a poorly-regulated, symmetric, and reactive 6-7 Hz posterior dominant rhythm was seen. No consistent focal slowing or interhemispheric asymmetry was noted. Stage I and stage II sleep were observed. There were no interictal epileptiform discharges or electrographic seizures.    Activations  Hyperventilation was not performed. Intermittent photic stimulation was performed and demonstrated no posterior driving response.    Impression  Abnormal awake EEG. The slowing mentioned above suggests mild non specific encephalopathy.        EKG lead did not show clear arrhythmia, if still in concern consider formal EKG or correlation with telemetry.       No epileptiform discharges were identified. Please note the absence of such activity on this record cannot conclusively rule out an epileptic disorder. If such is still clinically suspected, a repeat study with sleep deprivation and/or prolonged sampling may be helpful.    Tatianna Otoole MD  Epilepsy Board Certified.  Neurology Board Certified.    Electronically Signed

## 2024-12-30 NOTE — PLAN OF CARE
Problem: Chronic Conditions and Co-morbidities  Goal: Patient's chronic conditions and co-morbidity symptoms are monitored and maintained or improved  Outcome: Progressing  Flowsheets (Taken 12/29/2024 2141)  Care Plan - Patient's Chronic Conditions and Co-Morbidity Symptoms are Monitored and Maintained or Improved: Monitor and assess patient's chronic conditions and comorbid symptoms for stability, deterioration, or improvement     Problem: Discharge Planning  Goal: Discharge to home or other facility with appropriate resources  Outcome: Progressing  Flowsheets (Taken 12/29/2024 2141)  Discharge to home or other facility with appropriate resources:   Identify barriers to discharge with patient and caregiver   Arrange for needed discharge resources and transportation as appropriate   Identify discharge learning needs (meds, wound care, etc)   Refer to discharge planning if patient needs post-hospital services based on physician order or complex needs related to functional status, cognitive ability or social support system     Problem: Safety - Adult  Goal: Free from fall injury  Outcome: Progressing

## 2024-12-30 NOTE — PROGRESS NOTES
[facility]  Speech Language Pathology    Date: 12/30/2024  Patient Name: Nargis Martin  YOB: 1935   AGE: 89 y.o.  MRN: [id number]        Patient Not Available for Speech Therapy     Due to:  [] Testing  [] Hemodialysis  [] Cancelled by RN  [] Surgery   [] Intubation/Sedation/Pain Medication  [] Medical instability  [x] Other:  Reviewed chart and poke with RN.  Pt with history of dysphagia and esophageal stricture, already diagnosed and being addressed.  Pt with no change in swallow function on this admission per RN.  Please re-order as needed      Completed by: Yana Sanderson, SLP, M.S. CCC-SLP

## 2024-12-31 PROBLEM — G91.2 NPH (NORMAL PRESSURE HYDROCEPHALUS) (HCC): Status: ACTIVE | Noted: 2024-12-31

## 2024-12-31 PROBLEM — G93.41 ACUTE METABOLIC ENCEPHALOPATHY: Status: ACTIVE | Noted: 2024-12-31

## 2024-12-31 LAB
ANION GAP SERPL CALCULATED.3IONS-SCNC: 9 MMOL/L (ref 9–17)
B PARAP IS1001 DNA NPH QL NAA+NON-PROBE: NOT DETECTED
B PERT DNA SPEC QL NAA+PROBE: NOT DETECTED
BASOPHILS # BLD: 0.03 K/UL (ref 0–0.2)
BASOPHILS NFR BLD: 0 % (ref 0–2)
BUN SERPL-MCNC: 16 MG/DL (ref 8–23)
C PNEUM DNA NPH QL NAA+NON-PROBE: NOT DETECTED
CALCIUM SERPL-MCNC: 8.6 MG/DL (ref 8.6–10.4)
CHLORIDE SERPL-SCNC: 96 MMOL/L (ref 98–107)
CO2 SERPL-SCNC: 30 MMOL/L (ref 20–31)
CREAT SERPL-MCNC: 0.9 MG/DL (ref 0.5–0.9)
EOSINOPHIL # BLD: 0.26 K/UL (ref 0–0.4)
EOSINOPHILS RELATIVE PERCENT: 4 % (ref 1–4)
ERYTHROCYTE [DISTWIDTH] IN BLOOD BY AUTOMATED COUNT: 12.5 % (ref 12.5–15.4)
FLUAV RNA NPH QL NAA+NON-PROBE: NOT DETECTED
FLUBV RNA NPH QL NAA+NON-PROBE: NOT DETECTED
GFR, ESTIMATED: 61 ML/MIN/1.73M2
GLUCOSE SERPL-MCNC: 137 MG/DL (ref 70–99)
HADV DNA NPH QL NAA+NON-PROBE: NOT DETECTED
HCOV 229E RNA NPH QL NAA+NON-PROBE: NOT DETECTED
HCOV HKU1 RNA NPH QL NAA+NON-PROBE: NOT DETECTED
HCOV NL63 RNA NPH QL NAA+NON-PROBE: DETECTED
HCOV OC43 RNA NPH QL NAA+NON-PROBE: NOT DETECTED
HCT VFR BLD AUTO: 37.4 % (ref 36–46)
HGB BLD-MCNC: 12.6 G/DL (ref 12–16)
HMPV RNA NPH QL NAA+NON-PROBE: NOT DETECTED
HPIV1 RNA NPH QL NAA+NON-PROBE: NOT DETECTED
HPIV2 RNA NPH QL NAA+NON-PROBE: NOT DETECTED
HPIV3 RNA NPH QL NAA+NON-PROBE: NOT DETECTED
HPIV4 RNA NPH QL NAA+NON-PROBE: NOT DETECTED
LYMPHOCYTES NFR BLD: 1.7 K/UL (ref 1–4.8)
LYMPHOCYTES RELATIVE PERCENT: 25 % (ref 24–44)
M PNEUMO DNA NPH QL NAA+NON-PROBE: NOT DETECTED
MCH RBC QN AUTO: 29.7 PG (ref 26–34)
MCHC RBC AUTO-ENTMCNC: 33.7 G/DL (ref 31–37)
MCV RBC AUTO: 88.2 FL (ref 80–100)
MONOCYTES NFR BLD: 0.94 K/UL (ref 0.1–1.2)
MONOCYTES NFR BLD: 14 % (ref 2–11)
NEUTROPHILS NFR BLD: 57 % (ref 36–66)
NEUTS SEG NFR BLD: 4.01 K/UL (ref 1.8–7.7)
PLATELET # BLD AUTO: 177 K/UL (ref 140–450)
PMV BLD AUTO: 9.8 FL (ref 8–14)
POTASSIUM SERPL-SCNC: 3.6 MMOL/L (ref 3.7–5.3)
RBC # BLD AUTO: 4.24 M/UL (ref 4–5.2)
RSV RNA NPH QL NAA+NON-PROBE: NOT DETECTED
RV+EV RNA NPH QL NAA+NON-PROBE: NOT DETECTED
SARS-COV-2 RNA NPH QL NAA+NON-PROBE: NOT DETECTED
SODIUM SERPL-SCNC: 135 MMOL/L (ref 135–144)
SPECIMEN DESCRIPTION: ABNORMAL
WBC OTHER # BLD: 6.9 K/UL (ref 3.5–11)

## 2024-12-31 PROCEDURE — 99232 SBSQ HOSP IP/OBS MODERATE 35: CPT | Performed by: STUDENT IN AN ORGANIZED HEALTH CARE EDUCATION/TRAINING PROGRAM

## 2024-12-31 PROCEDURE — 2500000003 HC RX 250 WO HCPCS: Performed by: STUDENT IN AN ORGANIZED HEALTH CARE EDUCATION/TRAINING PROGRAM

## 2024-12-31 PROCEDURE — 6370000000 HC RX 637 (ALT 250 FOR IP): Performed by: STUDENT IN AN ORGANIZED HEALTH CARE EDUCATION/TRAINING PROGRAM

## 2024-12-31 PROCEDURE — 36415 COLL VENOUS BLD VENIPUNCTURE: CPT

## 2024-12-31 PROCEDURE — 1200000000 HC SEMI PRIVATE

## 2024-12-31 PROCEDURE — 6360000002 HC RX W HCPCS: Performed by: STUDENT IN AN ORGANIZED HEALTH CARE EDUCATION/TRAINING PROGRAM

## 2024-12-31 PROCEDURE — 80048 BASIC METABOLIC PNL TOTAL CA: CPT

## 2024-12-31 PROCEDURE — 85025 COMPLETE CBC W/AUTO DIFF WBC: CPT

## 2024-12-31 RX ORDER — BENZONATATE 100 MG/1
100 CAPSULE ORAL 3 TIMES DAILY PRN
Status: DISCONTINUED | OUTPATIENT
Start: 2024-12-31 | End: 2025-01-01 | Stop reason: HOSPADM

## 2024-12-31 RX ORDER — GUAIFENESIN AND DEXTROMETHORPHAN HYDROBROMIDE 10; 100 MG/5ML; MG/5ML
5 SYRUP ORAL EVERY 4 HOURS PRN
Status: DISCONTINUED | OUTPATIENT
Start: 2024-12-31 | End: 2025-01-01 | Stop reason: HOSPADM

## 2024-12-31 RX ADMIN — PANTOPRAZOLE SODIUM 40 MG: 40 TABLET, DELAYED RELEASE ORAL at 05:59

## 2024-12-31 RX ADMIN — TRAZODONE HYDROCHLORIDE 100 MG: 50 TABLET ORAL at 20:56

## 2024-12-31 RX ADMIN — LEVETIRACETAM 250 MG: 500 SOLUTION ORAL at 20:56

## 2024-12-31 RX ADMIN — LEVETIRACETAM 250 MG: 500 SOLUTION ORAL at 10:16

## 2024-12-31 RX ADMIN — CARVEDILOL 3.12 MG: 3.12 TABLET, FILM COATED ORAL at 18:22

## 2024-12-31 RX ADMIN — SODIUM CHLORIDE, PRESERVATIVE FREE 10 ML: 5 INJECTION INTRAVENOUS at 10:21

## 2024-12-31 RX ADMIN — GUAIFENESIN SYRUP AND DEXTROMETHORPHAN 5 ML: 100; 10 SYRUP ORAL at 20:56

## 2024-12-31 RX ADMIN — MAGNESIUM HYDROXIDE 30 ML: 400 SUSPENSION ORAL at 10:16

## 2024-12-31 RX ADMIN — ENOXAPARIN SODIUM 40 MG: 100 INJECTION SUBCUTANEOUS at 10:16

## 2024-12-31 RX ADMIN — SODIUM CHLORIDE, PRESERVATIVE FREE 10 ML: 5 INJECTION INTRAVENOUS at 20:56

## 2024-12-31 RX ADMIN — CARVEDILOL 3.12 MG: 3.12 TABLET, FILM COATED ORAL at 10:16

## 2024-12-31 RX ADMIN — BENZONATATE 100 MG: 100 CAPSULE ORAL at 20:56

## 2024-12-31 NOTE — PROGRESS NOTES
[facility] Neurology   IN-PATIENT SERVICE   [facility]    Progress note             Date:   12/31/2024  Patient name:  Nargis Martin  Date of admission:  12/29/2024 10:49 AM  MRN:   [id number]  Account:  [id number]  YOB: 1935  PCP:    Joaquim Abdullahi MD  Room:   [address] [place] [address]  Code Status:    DNR-CCA    Chief Complaint:     Chief Complaint   Patient presents with    Fall     Patient arrives to ED with complaints of a fall. Patient lives with daughter and had a unwitnessed fall aprox 4-5am this morning. Patient was roughly on the ground 5 hours before found. Patient daughter states she found her on the floor in the bathroom. 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 Patient does not recall much of fall. Daughter states last fall was 2-3 months ago.     Altered Mental Status     Patient daughter reports alerted mental status this morning. Patient is currently alert and oriented x3 and able to make needs known.    Neurology Consulted 12/29/24 evening for concern of Seizure like activity     Interval hx:   The Patient was seen and examined at bedside  Is vitally stable alert oriented x3  No acute events overnight  Patient has been having increasing cough and increased sputum production found to have COVID 19 pneumonia newly this morning.  Otherwise doing well and denies any further seizure-like activity or complaints.  Brief History of Present Illness:   The patient is a 89 y.o.   female who presents to [place] ER 12/29/24 from home after being found down confused in the bathroom with unwitnessed fall and about 5 hours downtime before found by patients daughter whom she lives with. PMH significant for HTN, DM2, COPD, GERD and OA. History obtained primarily by patients daughter Vickie at bedside. She states that her mother has had progressive ambulatory difficulty well described as magnetic gait and recurrent falls for about the last 3 years

## 2024-12-31 NOTE — PROGRESS NOTES
[facility]  Office: [phone]  Redd Barton DO, Sourav Ortega DO, Jero Villagran DO, Xu Mathew DO, Maris Guillermo MD, Marie Prabhakar MD, Sabine Lazar MD, Rebecca Moore MD,  Murray Marquez MD, Tanmay Toledo MD, Colton Joshi MD,  Tatianna Hoffmann DO, Grey Moralez MD, Vadim Mathew MD, Art Barton DO, Tanika Naranjo MD,  Kvng Herring DO, Itzel Painting MD, Shirlene Maldonado MD, Idalia Ontiveros MD, Andria Nation MD,  Harpal Ramon MD, Erick Ventura MD, David Ball MD, Nelia Kelley MD, Roger Styles MD, Jayant Allison MD, Rashard Mclaughlin DO, Vladimir Donahue MD, Tatianna Doughetry MD, Mohsin Reza, MD, Shirley Waterhouse, CNP,  Azra Esparza CNP, Rashard Santa, CNP,  Ashanti Lopez, GERARDO, Mindy Inman, CNP, Sammie Multani, CNP, Dorita Denny, CNP, Beatriz Bautista, CNP, Sugey Gonzalez, PA-C, Josette Gomez PA-C, Kim Castorena, CNP, Blanca Resendez, CNP,  Massiel Dias, CNP, Alma Delia Soler, CNP, Abril Brooks, CNP,  Franny Santiago, JOSE, Latonia Chowdary, CNP         [facility]   IN-PATIENT SERVICE   [facility]    Progress Note    12/31/2024    3:28 PM    Name:   Nargis Martin  MRN:     [id number]     Acct:      [id number]   Room:   [facility]/[address] [place] [address] Day:  2  Admit Date:  12/29/2024 10:49 AM    PCP:   Joaquim Abdullahi MD  Code Status:  DNR-CCA    Subjective:     C/C:   Chief Complaint   Patient presents with    Fall     Patient arrives to ED with complaints of a fall. Patient lives with daughter and had a unwitnessed fall aprox 4-5am this morning. Patient was roughly on the ground 5 hours before found. Patient daughter states she found her on the floor in the bathroom. 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 Patient does not recall much of fall. Daughter states last fall was 2-3 months ago.     Altered Mental Status     Patient daughter reports alerted mental status this morning. Patient is currently alert and oriented x3 and  **OR** acetaminophen, sodium chloride flush    Data:     Past Medical History:   has a past medical history of Arthritis, COPD (chronic obstructive pulmonary disease) (HCC), Diabetes mellitus (HCC), GERD (gastroesophageal reflux disease), and Hypertension.    Social History:   reports that she quit smoking about 34 years ago. Her smoking use included cigarettes. She has never used smokeless tobacco. She reports that she does not drink alcohol and does not use drugs.     Family History: History reviewed. No pertinent family history.    Vitals:  BP (!) 136/59   Pulse 63   Temp 98.2 °F (36.8 °C) (Oral)   Resp 14   Ht 1.499 m (4' 11\")   Wt 55.2 kg (121 lb 11.1 oz)   SpO2 91%   BMI 24.58 kg/m²   Temp (24hrs), Av °F (36.7 °C), Min:97.7 °F (36.5 °C), Max:98.2 °F (36.8 °C)    No results for input(s): \"POCGLU\" in the last 72 hours.    I/O (24Hr):    Intake/Output Summary (Last 24 hours) at 2024 1528  Last data filed at 2024 1015  Gross per 24 hour   Intake 240 ml   Output 650 ml   Net -410 ml       Labs:  Hematology:  Recent Labs     24  1211 24  0613 24  0614   WBC 7.9 8.7 6.9   RBC 4.21 3.91* 4.24   HGB 12.5 12.0 12.6   HCT 37.2 34.5* 37.4   MCV 88.3 88.2 88.2   MCH 29.7 30.7 29.7   MCHC 33.7 34.8 33.7   RDW 13.3 13.3 12.5    168 177   MPV 7.9 7.8 9.8     Chemistry:  Recent Labs     24  1211 24  1356 24  0613 24  0614     --  135 135   K 3.7  --  3.1* 3.6*   CL 98  --  96* 96*   CO2 28  --  27 30   GLUCOSE 102*  --  125* 137*   BUN 13  --  14 16   CREATININE 0.8  --  0.9 0.9   MG  --   --  1.9  --    ANIONGAP 9  --  12 9   LABGLOM 70  --  61 61   CALCIUM 9.0  --  8.4* 8.6   PROBNP 1,604*  --   --   --    TROPHS 15* 16*  --   --    CKTOTAL 76  --   --   --    MYOGLOBIN 82*  --   --   --      Recent Labs     24  1211   AST 16   ALT 11   ALKPHOS 66   BILITOT 0.4     ABG:No results found for: \"POCPH\", \"PHART\", \"PH\", \"POCPCO2\", \"[id number]\",

## 2024-12-31 NOTE — PLAN OF CARE
Problem: Chronic Conditions and Co-morbidities  Goal: Patient's chronic conditions and co-morbidity symptoms are monitored and maintained or improved  Outcome: Progressing  Flowsheets (Taken 12/30/2024 2000)  Care Plan - Patient's Chronic Conditions and Co-Morbidity Symptoms are Monitored and Maintained or Improved: Monitor and assess patient's chronic conditions and comorbid symptoms for stability, deterioration, or improvement     Problem: Discharge Planning  Goal: Discharge to home or other facility with appropriate resources  Outcome: Progressing  Flowsheets (Taken 12/30/2024 2000)  Discharge to home or other facility with appropriate resources:   Identify barriers to discharge with patient and caregiver   Arrange for needed discharge resources and transportation as appropriate   Identify discharge learning needs (meds, wound care, etc)   Refer to discharge planning if patient needs post-hospital services based on physician order or complex needs related to functional status, cognitive ability or social support system     Problem: Safety - Adult  Goal: Free from fall injury  Outcome: Progressing     Problem: Skin/Tissue Integrity  Goal: Absence of new skin breakdown  Description: 1.  Monitor for areas of redness and/or skin breakdown  2.  Assess vascular access sites hourly  3.  Every 4-6 hours minimum:  Change oxygen saturation probe site  4.  Every 4-6 hours:  If on nasal continuous positive airway pressure, respiratory therapy assess nares and determine need for appliance change or resting period.  Outcome: Progressing

## 2024-12-31 NOTE — PROGRESS NOTES
Spiritual Health History and Assessment/Progress Note  [facility]    (P) Initial Encounter, Spiritual/Emotional Needs,  ,  ,      Name: Nargis Martin MRN: [id number]    Age: 89 y.o.     Sex: female   Language: English   Anabaptist: Oriental orthodox   Post-ictal confusion     Date: 12/31/2024            Total Time Calculated: (P) 20 min              Spiritual Assessment began in [address]        Referral/Consult From: (P) Rounding   Encounter Overview/Reason: (P) Initial Encounter, Spiritual/Emotional Needs  Service Provided For: (P) Patient and family together (Daughter Present)    Arianna, Belief, Meaning:   Patient identifies as spiritual, is connected with a arianna tradition or spiritual practice, and has beliefs or practices that help with coping during difficult times  Family/Friends identify as spiritual, are connected with a arianna tradition or spiritual practice, and have beliefs or practices that help with coping during difficult times      Importance and Influence:  Patient has spiritual/personal beliefs that influence decisions regarding their health  Family/Friends have spiritual/personal beliefs that influence decisions regarding the patient's health    Community:  Patient is connected with a spiritual community and feels well-supported. Support system includes: Children, Arianna Community, and Extended family  Family/Friends are connected with a spiritual community: and feel well-supported. Support system includes: Arianna Community and Extended family    Assessment and Plan of Care:     Patient Interventions include: Facilitated expression of thoughts and feelings and Provided sacramental/Buddhism ritual  Family/Friends Interventions include: Facilitated expression of thoughts and feelings and Provided sacramental/Buddhism ritual    Patient Plan of Care: Spiritual Care available upon further referral  Family/Friends Plan of Care: Spiritual Care available upon further referral    Electronically  signed by Chaplain Mariluz on 12/31/2024 at 2:55 PM       12/31/24 1453   Encounter Summary   Encounter Overview/Reason Initial Encounter;Spiritual/Emotional Needs   Service Provided For Patient and family together  (Daughter Present)   Referral/Consult From [facility]   Support System Children;Family members   Last Encounter  12/31/24   Complexity of Encounter Low   Begin Time 1420   End Time  1440   Total Time Calculated 20 min   Spiritual/Emotional needs   Type Spiritual Support   Assessment/Intervention/Outcome   Assessment Calm;Coping;Hopeful;Peaceful   Intervention Active listening;Discussed illness injury and it’s impact;Explored/Affirmed feelings, thoughts, concerns;Prayer (assurance of)/[place];Sustaining Presence/Ministry of presence   Outcome Connection/Belonging;Comfort;Engaged in conversation;Expressed feelings, needs, and concerns;Expressed Gratitude   Plan and Referrals   Plan/Referrals Continue Support (comment)

## 2024-12-31 NOTE — PLAN OF CARE
Problem: Chronic Conditions and Co-morbidities  Goal: Patient's chronic conditions and co-morbidity symptoms are monitored and maintained or improved  12/31/2024 1830 by Criselda Pathak LPN  Outcome: Progressing  Flowsheets (Taken 12/31/2024 0900)  Care Plan - Patient's Chronic Conditions and Co-Morbidity Symptoms are Monitored and Maintained or Improved:   Monitor and assess patient's chronic conditions and comorbid symptoms for stability, deterioration, or improvement   Collaborate with multidisciplinary team to address chronic and comorbid conditions and prevent exacerbation or deterioration   Update acute care plan with appropriate goals if chronic or comorbid symptoms are exacerbated and prevent overall improvement and discharge  12/31/2024 0504 by Angie Ramirez, RN  Outcome: Progressing  Flowsheets (Taken 12/30/2024 2000)  Care Plan - Patient's Chronic Conditions and Co-Morbidity Symptoms are Monitored and Maintained or Improved: Monitor and assess patient's chronic conditions and comorbid symptoms for stability, deterioration, or improvement     Problem: Discharge Planning  Goal: Discharge to home or other facility with appropriate resources  12/31/2024 1830 by Criselda Pathak LPN  Outcome: Progressing  Flowsheets (Taken 12/31/2024 0900)  Discharge to home or other facility with appropriate resources:   Identify barriers to discharge with patient and caregiver   Arrange for needed discharge resources and transportation as appropriate   Identify discharge learning needs (meds, wound care, etc)   Refer to discharge planning if patient needs post-hospital services based on physician order or complex needs related to functional status, cognitive ability or social support system  12/31/2024 0504 by Angie Ramirez, RN  Outcome: Progressing  Flowsheets (Taken 12/30/2024 2000)  Discharge to home or other facility with appropriate resources:   Identify barriers to discharge with patient and caregiver   Arrange for needed  discharge resources and transportation as appropriate   Identify discharge learning needs (meds, wound care, etc)   Refer to discharge planning if patient needs post-hospital services based on physician order or complex needs related to functional status, cognitive ability or social support system     Problem: Safety - Adult  Goal: Free from fall injury  12/31/2024 1830 by Criselda Pathak LPN  Outcome: Progressing  12/31/2024 0504 by Angie Ramirez, RN  Outcome: Progressing     Problem: Skin/Tissue Integrity  Goal: Absence of new skin breakdown  Description: 1.  Monitor for areas of redness and/or skin breakdown  2.  Assess vascular access sites hourly  3.  Every 4-6 hours minimum:  Change oxygen saturation probe site  4.  Every 4-6 hours:  If on nasal continuous positive airway pressure, respiratory therapy assess nares and determine need for appliance change or resting period.  12/31/2024 1830 by Criselda Pathak LPN  Outcome: Progressing  12/31/2024 0504 by Angie Ramirez, RN  Outcome: Progressing

## 2024-12-31 NOTE — CARE COORDINATION
VJ spoke with patient and her daughter to discuss transitional planning. Patient has decided on SNF at discharge and is [facility]. VJ called and left message for Yamini with [facility] giving update that patient has chosen SNF.    1200-Call received from Yamini with [facility] confirming [facility] will have bed for patient.

## 2025-01-01 VITALS
HEART RATE: 66 BPM | DIASTOLIC BLOOD PRESSURE: 50 MMHG | TEMPERATURE: 98.2 F | OXYGEN SATURATION: 92 % | BODY MASS INDEX: 24.49 KG/M2 | HEIGHT: 59 IN | SYSTOLIC BLOOD PRESSURE: 142 MMHG | RESPIRATION RATE: 18 BRPM | WEIGHT: 121.47 LBS

## 2025-01-01 PROBLEM — I50.9 CONGESTIVE HEART FAILURE (HCC): Status: RESOLVED | Noted: 2024-12-30 | Resolved: 2025-01-01

## 2025-01-01 LAB
ANION GAP SERPL CALCULATED.3IONS-SCNC: 7 MMOL/L (ref 9–17)
BASOPHILS # BLD: 0.1 K/UL (ref 0–0.2)
BASOPHILS NFR BLD: 1 % (ref 0–2)
BUN SERPL-MCNC: 19 MG/DL (ref 8–23)
CALCIUM SERPL-MCNC: 8.4 MG/DL (ref 8.6–10.4)
CHLORIDE SERPL-SCNC: 98 MMOL/L (ref 98–107)
CO2 SERPL-SCNC: 32 MMOL/L (ref 20–31)
CREAT SERPL-MCNC: 0.8 MG/DL (ref 0.5–0.9)
EKG ATRIAL RATE: 71 BPM
EKG P AXIS: 59 DEGREES
EKG P-R INTERVAL: 152 MS
EKG Q-T INTERVAL: 388 MS
EKG QRS DURATION: 72 MS
EKG QTC CALCULATION (BAZETT): 421 MS
EKG R AXIS: 30 DEGREES
EKG T AXIS: 63 DEGREES
EKG VENTRICULAR RATE: 71 BPM
EOSINOPHIL # BLD: 0.4 K/UL (ref 0–0.4)
EOSINOPHILS RELATIVE PERCENT: 6 % (ref 1–4)
ERYTHROCYTE [DISTWIDTH] IN BLOOD BY AUTOMATED COUNT: 13.3 % (ref 12.5–15.4)
GFR, ESTIMATED: 70 ML/MIN/1.73M2
GLUCOSE SERPL-MCNC: 108 MG/DL (ref 70–99)
HCT VFR BLD AUTO: 33.4 % (ref 36–46)
HGB BLD-MCNC: 11.4 G/DL (ref 12–16)
LYMPHOCYTES NFR BLD: 1.6 K/UL (ref 1–4.8)
LYMPHOCYTES RELATIVE PERCENT: 26 % (ref 24–44)
MAGNESIUM SERPL-MCNC: 2.2 MG/DL (ref 1.6–2.6)
MCH RBC QN AUTO: 30.1 PG (ref 26–34)
MCHC RBC AUTO-ENTMCNC: 34.2 G/DL (ref 31–37)
MCV RBC AUTO: 87.8 FL (ref 80–100)
MONOCYTES NFR BLD: 0.9 K/UL (ref 0.1–1.2)
MONOCYTES NFR BLD: 14 % (ref 2–11)
NEUTROPHILS NFR BLD: 53 % (ref 36–66)
NEUTS SEG NFR BLD: 3.3 K/UL (ref 1.8–7.7)
PLATELET # BLD AUTO: 199 K/UL (ref 140–450)
PMV BLD AUTO: 8.1 FL (ref 6–12)
POTASSIUM SERPL-SCNC: 3.4 MMOL/L (ref 3.7–5.3)
RBC # BLD AUTO: 3.8 M/UL (ref 4–5.2)
SODIUM SERPL-SCNC: 137 MMOL/L (ref 135–144)
WBC OTHER # BLD: 6.2 K/UL (ref 3.5–11)

## 2025-01-01 PROCEDURE — 6370000000 HC RX 637 (ALT 250 FOR IP): Performed by: STUDENT IN AN ORGANIZED HEALTH CARE EDUCATION/TRAINING PROGRAM

## 2025-01-01 PROCEDURE — 93010 ELECTROCARDIOGRAM REPORT: CPT | Performed by: INTERNAL MEDICINE

## 2025-01-01 PROCEDURE — 80048 BASIC METABOLIC PNL TOTAL CA: CPT

## 2025-01-01 PROCEDURE — 97116 GAIT TRAINING THERAPY: CPT

## 2025-01-01 PROCEDURE — 2500000003 HC RX 250 WO HCPCS: Performed by: STUDENT IN AN ORGANIZED HEALTH CARE EDUCATION/TRAINING PROGRAM

## 2025-01-01 PROCEDURE — 97530 THERAPEUTIC ACTIVITIES: CPT

## 2025-01-01 PROCEDURE — 99232 SBSQ HOSP IP/OBS MODERATE 35: CPT | Performed by: STUDENT IN AN ORGANIZED HEALTH CARE EDUCATION/TRAINING PROGRAM

## 2025-01-01 PROCEDURE — 83735 ASSAY OF MAGNESIUM: CPT

## 2025-01-01 PROCEDURE — 36415 COLL VENOUS BLD VENIPUNCTURE: CPT

## 2025-01-01 PROCEDURE — 85025 COMPLETE CBC W/AUTO DIFF WBC: CPT

## 2025-01-01 PROCEDURE — 6360000002 HC RX W HCPCS: Performed by: STUDENT IN AN ORGANIZED HEALTH CARE EDUCATION/TRAINING PROGRAM

## 2025-01-01 RX ORDER — BENZONATATE 100 MG/1
100 CAPSULE ORAL 3 TIMES DAILY PRN
Qty: 21 CAPSULE | Refills: 0 | Status: SHIPPED | OUTPATIENT
Start: 2025-01-01 | End: 2025-01-08

## 2025-01-01 RX ORDER — ALBUTEROL SULFATE 90 UG/1
2 INHALANT RESPIRATORY (INHALATION) 4 TIMES DAILY PRN
Qty: 18 G | Refills: 0 | Status: SHIPPED | OUTPATIENT
Start: 2025-01-01

## 2025-01-01 RX ORDER — GUAIFENESIN AND DEXTROMETHORPHAN HYDROBROMIDE 10; 100 MG/5ML; MG/5ML
5 SYRUP ORAL EVERY 4 HOURS PRN
Qty: 120 ML | Refills: 0 | Status: SHIPPED | OUTPATIENT
Start: 2025-01-01

## 2025-01-01 RX ORDER — ASPIRIN 325 MG
325 TABLET, DELAYED RELEASE (ENTERIC COATED) ORAL DAILY
Status: DISCONTINUED | OUTPATIENT
Start: 2025-01-01 | End: 2025-01-01 | Stop reason: HOSPADM

## 2025-01-01 RX ORDER — LEVOFLOXACIN 750 MG/1
750 TABLET, FILM COATED ORAL DAILY
Qty: 5 TABLET | Refills: 0 | Status: SHIPPED | OUTPATIENT
Start: 2025-01-01 | End: 2025-01-06

## 2025-01-01 RX ORDER — ALBUTEROL SULFATE 90 UG/1
2 INHALANT RESPIRATORY (INHALATION) EVERY 6 HOURS PRN
Status: DISCONTINUED | OUTPATIENT
Start: 2025-01-01 | End: 2025-01-01 | Stop reason: HOSPADM

## 2025-01-01 RX ORDER — CARVEDILOL 3.12 MG/1
3.12 TABLET ORAL 2 TIMES DAILY WITH MEALS
Qty: 60 TABLET | Refills: 3 | Status: SHIPPED | OUTPATIENT
Start: 2025-01-01

## 2025-01-01 RX ADMIN — BENZONATATE 100 MG: 100 CAPSULE ORAL at 17:00

## 2025-01-01 RX ADMIN — LEVOFLOXACIN 750 MG: 750 TABLET, FILM COATED ORAL at 11:06

## 2025-01-01 RX ADMIN — LEVETIRACETAM 250 MG: 500 SOLUTION ORAL at 11:04

## 2025-01-01 RX ADMIN — SODIUM CHLORIDE, PRESERVATIVE FREE 10 ML: 5 INJECTION INTRAVENOUS at 11:07

## 2025-01-01 RX ADMIN — CARVEDILOL 3.12 MG: 3.12 TABLET, FILM COATED ORAL at 11:06

## 2025-01-01 RX ADMIN — ENOXAPARIN SODIUM 40 MG: 100 INJECTION SUBCUTANEOUS at 11:07

## 2025-01-01 RX ADMIN — ONDANSETRON 4 MG: 2 INJECTION INTRAMUSCULAR; INTRAVENOUS at 12:42

## 2025-01-01 RX ADMIN — POTASSIUM BICARBONATE 40 MEQ: 782 TABLET, EFFERVESCENT ORAL at 11:06

## 2025-01-01 NOTE — CARE COORDINATION
SOCIAL WORK spoke to on  call at [facility] they will review [facility] and call back. They plan to accept. Phone [phone] and fax [phone] accepted and pt will be seen tomorrow.  [facility] faxed.Ashanti villa

## 2025-01-01 NOTE — DISCHARGE INSTR - COC
Continuity of Care Form    Patient Name: Nargis Martin   :  1935  MRN:  [id number]    Admit date:  2024  Discharge date:  2025    Code Status Order: DNR-CCA   Advance Directives:   Advance Care Flowsheet Documentation             Admitting Physician:  Colton Joshi MD  PCP: Joaquim Abdullahi MD    Discharging Nurse: [facility]/Room#: 309/309-01  Discharging Unit Phone Number: [phone]    Emergency Contact:   Extended Emergency Contact Information  Primary Emergency Contact: Vickie Singletary  Home Phone: [phone]  Relation: Child    Past Surgical History:  Past Surgical History:   Procedure Laterality Date    APPENDECTOMY      CARPAL TUNNEL RELEASE Right     JOINT REPLACEMENT Right     has had both right and left done (4 total)    OVARY REMOVAL Right     WRIST SURGERY Left 6-25-15    carpel tunnel, thumb surgery       Immunization History:   Immunization History   Administered Date(s) Administered    COVID-19, PFIZER PURPLE top, DILUTE for use, (age 12 y+), 30mcg/0.3mL 2021, 2021       Active Problems:  Patient Active Problem List   Diagnosis Code    Acquired spondylolisthesis M43.10    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    Lumbago M54.50    Spinal stenosis, lumbar region, without neurogenic claudication M48.061    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    Back pain M54.9    CMC arthritis M19.049    Post-ictal confusion F05    Pharyngoesophageal phase dysphagia R13.14    Focal epilepsy with impairment of consciousness ([facility]) G40.209    Fatigue R53.83    Failure to thrive in adult R62.7    Depressive disorder F32.A    Hydrocephalus ([facility]) G91.9    Carotid stenosis, bilateral I65.23    Acute metabolic encephalopathy G93.41    NPH (normal pressure hydrocephalus) ([facility]) G91.2       Isolation/Infection:   Isolation            Droplet Plus          Patient Infection Status       None to display                     Nurse Assessment:  Last

## 2025-01-01 NOTE — PROGRESS NOTES
Physical Therapy  Facility/Department: [address]  Physical Therapy Daily Progress Note    Name: Nargis Martin  : 1935  MRN: [id number]  Date of Service: 2025    Discharge Recommendations:  Patient would benefit from continued therapy after discharge   PT Equipment Recommendations  Equipment Needed: No      Patient Diagnosis(es): The encounter diagnosis was Congestive heart failure, unspecified HF chronicity, unspecified heart failure type (HCC).  Past Medical History:  has a past medical history of Arthritis, COPD (chronic obstructive pulmonary disease) (HCC), Diabetes mellitus (HCC), GERD (gastroesophageal reflux disease), and Hypertension.  Past Surgical History:  has a past surgical history that includes joint replacement (Right); Appendectomy; Ovary removal (Right); Carpal tunnel release (Right); and Wrist surgery (Left, 6-25-15).    Assessment  Body Structures, Functions, Activity Limitations Requiring Skilled Therapeutic Intervention: Decreased functional mobility ;Decreased cognition;Decreased endurance;Decreased ADL status;Decreased posture;Decreased balance;Decreased safe awareness;Decreased strength  Assessment: Pt presents after unwitnessed fall at home, on ground x 5 hours, (+) UTI. At baseline, pt lives in specially built area off of daughter's house, ind gait with 4ww, ind ADL's. Pt transfers with CGA and ambulated with rollator 70' and 40' with Min-CGA; mild unsteadiness and lateral sway of rollator during ambulation. Pt does not demonstrate adequate safety for return to prior living situation. Pt will benefit from continued skilled PT for strenghening, safety, balance, endurance and functional mobility while in the hospital and at discharge.  Therapy Prognosis: Good  Requires PT Follow-Up: Yes  Activity Tolerance  Activity Tolerance: Patient limited by fatigue;Patient limited by endurance    Plan  Physical Therapy Plan  General Plan:  (5-6x/week)  Current Treatment Recommendations:

## 2025-01-01 NOTE — PROGRESS NOTES
living arrangements.    Prognosis: Good  REQUIRES OT FOLLOW-UP: Yes    Activity Tolerance  Activity Tolerance: Patient limited by fatigue  Activity Tolerance Comments: Pt completed 1 set of 10 repetitions of bicep curls (use of water bottole in R hand for weight; AROM for LUE due to shoulder issues); x10 repetitions of shoulder shrugs; pt reports/presents with limited ability to complete BUE HEP due to L shoulder arthritis; trialed adaptive exercises however pt resistant    Safety Devices  Type of Devices: Call light within reach;Chair alarm in place;Gait belt;Left in chair;Nurse notified    Restraints  Restraints Initially in Place: No    AM-PAC  AM-[facility] Daily Activity - Inpatient   How much help is needed for putting on and taking off regular lower body clothing?: A Lot  How much help is needed for bathing (which includes washing, rinsing, drying)?: A Lot  How much help is needed for toileting (which includes using toilet, bedpan, or urinal)?: A Lot  How much help is needed for putting on and taking off regular upper body clothing?: A Lot  How much help is needed for taking care of personal grooming?: A Little  How much help for eating meals?: A Little  AM-[facility] Inpatient Daily Activity Raw Score: 14  AM-PAC Inpatient ADL T-Scale Score : 33.39  ADL Inpatient CMS 0-100% Score: 59.67  ADL Inpatient CMS G-Code Modifier : CK    Restrictions/Precautions  Restrictions/Precautions  Restrictions/Precautions: Fall Risk;General Precautions;Bed Alarm  Activity Level: Up as Tolerated  Required Braces or Orthoses?: No  Position Activity Restriction  Other Position/Activity Restrictions: dizziness    Subjective  General  Patient assessed for rehabilitation services?: Yes  Family / Caregiver Present: No  Subjective  Subjective: RN ok'd pt for therapy tx this PM. Pt reporting 2/10 pain in L shoulder at time of tx and was repositioned for comfort. Pt agreeable/cooperative throughout.     O2 Device: None (Room  Patient  Education Provided: Transfer Training;Equipment;Energy Conservation;Fall Prevention Strategies;Home Exercise Program  Education Provided Comments: activity promotion, safety, attention to balance  Education Method: Demonstration;Verbal  Barriers to Learning: None  Education Outcome: Verbalized understanding;Continued education needed    Goals  Patient Goals   Patient goals : get my strength back  Short Term Goals  Time Frame for Short Term Goals: 14 visits  Short Term Goal 1: Pt will complete UB ADLs/grooming with MOD IND  Short Term Goal 2: Pt will complete LB ADLs/toileting with MOD IND  Short Term Goal 3: Pt will complete functional mobility/transfers with MOD IND in prep for ADL completion  Short Term Goal 4: Pt will improve dynamic standing balance to good for increased safety during standing aspects of ADL completion    Plan  Occupational Therapy Plan  Times Per Week: 5-6x/wk  Current Treatment Recommendations: Functional mobility training, Endurance training, Patient/Caregiver education & training, Safety education & training, Equipment evaluation, education, & procurement, Self-Care / ADL    Minutes  OT Individual Minutes  Time In: 1316  Time Out: 1343  Minutes: 27  Time Code Minutes   Timed Code Treatment Minutes: 8 Minutes    Co-tx with PT    Electronically signed by ARVIND Quijano on 1/1/25 at 2:07 PM EST

## 2025-01-01 NOTE — PROGRESS NOTES
[facility]  Office: [phone]  Redd Barton DO, Sourav Ortega DO, Jero Villagran DO, Xu Mathew DO, Maris Guillermo MD, Marie Prabhakar MD, Sabine Lazar MD, Rebecca Moore MD,  Murray Marquez MD, Tanmay Toledo MD, Colton Joshi MD,  Tatianna Hoffmann DO, Grey Moralez MD, Vadim Mathew MD, Art Barton DO, Tanika Naranjo MD,  Kvng Herring DO, Itzel Painting MD, Shirlene Maldonado MD, Idalia Ontiveros MD, Andria Nation MD,  Harpal Ramon MD, Erick Ventura MD, David Ball MD, Nelia Kelley MD, Roger Styles MD, Jayant Allison MD, Rashard Mclaughlin DO, Vladimir Donahue MD, Tatianna Dougherty MD, Mohsin Reza, MD, Shirley Waterhouse, CNP,  Azra Epsarza CNP, Rashard Santa, CNP,  Ashanti Lopez, GERARDO, Mindy Inman, CNP, Sammie Multani, CNP, Doirta Denny, CNP, Beatriz Bautista, CNP, Sugey Gonzalez, PA-C, Josette Gomez PA-C, Kim Castorena, CNP, Blanca Resendez, CNP,  Massiel Dias, CNP, Alma Delia Soler, CNP, Abril Brooks, CNP,  Franny Santiago, CNP, Latonia Chowdary, CNP         [facility]   IN-PATIENT SERVICE   [facility]    Progress Note    1/1/2025    2:22 PM    Name:   Nargis Martin  MRN:     [id number]     Acct:      [id number]   Room:   [facility]/[address] [place] [address] Day:  3  Admit Date:  12/29/2024 10:49 AM    PCP:   Joaquim Abdullahi MD  Code Status:  DNR-CCA    Subjective:     C/C:   Chief Complaint   Patient presents with    Fall     Patient arrives to ED with complaints of a fall. Patient lives with daughter and had a unwitnessed fall aprox 4-5am this morning. Patient was roughly on the ground 5 hours before found. Patient daughter states she found her on the floor in the bathroom. 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 Patient does not recall much of fall. Daughter states last fall was 2-3 months ago.     Altered Mental Status     Patient daughter reports alerted mental status this morning. Patient is currently alert and oriented x3

## 2025-01-01 NOTE — PLAN OF CARE
Problem: Chronic Conditions and Co-morbidities  Goal: Patient's chronic conditions and co-morbidity symptoms are monitored and maintained or improved  1/1/2025 0625 by Angie Ramirez RN  Outcome: Progressing  Flowsheets (Taken 12/31/2024 2040)  Care Plan - Patient's Chronic Conditions and Co-Morbidity Symptoms are Monitored and Maintained or Improved: Monitor and assess patient's chronic conditions and comorbid symptoms for stability, deterioration, or improvement     Problem: Discharge Planning  Goal: Discharge to home or other facility with appropriate resources  1/1/2025 0625 by Angie Ramirez RN  Outcome: Progressing  Flowsheets (Taken 12/31/2024 2040)  Discharge to home or other facility with appropriate resources:   Identify barriers to discharge with patient and caregiver   Arrange for needed discharge resources and transportation as appropriate   Identify discharge learning needs (meds, wound care, etc)   Refer to discharge planning if patient needs post-hospital services based on physician order or complex needs related to functional status, cognitive ability or social support system     Problem: Safety - Adult  Goal: Free from fall injury  1/1/2025 0625 by Angie Ramirez RN  Outcome: Progressing     Problem: Skin/Tissue Integrity  Goal: Absence of new skin breakdown  Description: 1.  Monitor for areas of redness and/or skin breakdown  2.  Assess vascular access sites hourly  3.  Every 4-6 hours minimum:  Change oxygen saturation probe site  4.  Every 4-6 hours:  If on nasal continuous positive airway pressure, respiratory therapy assess nares and determine need for appliance change or resting period.  1/1/2025 0625 by Angie Ramirez, RN  Outcome: Progressing

## 2025-01-01 NOTE — PROGRESS NOTES
Comprehensive Nutrition Assessment    Type and Reason for Visit:  Initial, Positive nutrition screen    Nutrition Recommendations/Plan:   Continue with current diet and supplements as ordered.  Monitor intake/weight/labs.     Malnutrition Assessment:  Malnutrition Status:  At risk for malnutrition (01/01/25 1806)    Context:  Acute Illness     Findings of the 6 clinical characteristics of malnutrition:  Energy Intake:  Mild decrease in energy intake  Weight Loss:  No weight loss     Body Fat Loss:  No body fat loss Orbital, Triceps   Muscle Mass Loss:  No muscle mass loss Temples (temporalis), Clavicles (pectoralis & deltoids)  Fluid Accumulation:  No fluid accumulation     Strength:  Not Performed    Nutrition Assessment:    Patient admitted with CHF, positive nutrition screen as stated by patient wt loss of > 8lbs, however Chart Everywhere indicates patient's weight has been stable x > six months. CBW of 121lbs, 8oz and wt on 8/9/2024 was 123lbs. Her BMI is 24.5 (nml.) She is consuming 26-50% of meals, but % of supplements (@ 100% TID provide 900kcal and 27g protein.) No additional nutritional intervnetions at this time.    Nutrition Related Findings:    Nml bowel sounds, no edema, labs/meds reviewed. Wound Type: None       Current Nutrition Intake & Therapies:    Average Meal Intake: 26-50%  Average Supplements Intake: %  ADULT DIET; Dysphagia - Soft and Bite Sized  ADULT ORAL NUTRITION SUPPLEMENT; Breakfast, Lunch, Dinner; Standard High Calorie/High Protein Oral Supplement    Anthropometric Measures:  Height: 149.9 cm (4' 11.02\")  Ideal Body Weight (IBW): 95 lbs (43 kg)       Current Body Weight: 55.1 kg (121 lb 7.6 oz), 127.9 % IBW. Weight Source:  (estimated)  Current BMI (kg/m2): 24.5  Usual Body Weight: 55.8 kg (123 lb)     % Weight Change (Calculated): -1.2  Weight Adjustment For: No Adjustment                 BMI Categories: Normal Weight (BMI 22.0 to 24.9) age over 65    Estimated Daily  Nutrient Needs:  Energy Requirements Based On: Kcal/kg  Weight Used for Energy Requirements: Current  Energy (kcal/day): 1250-1450kcal based on 25kcal/kg CBW  Weight Used for Protein Requirements: Current  Protein (g/day): 60-70g protein based on 1.2g/kg CBW  Method Used for Fluid Requirements: 1 ml/kcal  Fluid (ml/day): minimum 1250ml    Nutrition Diagnosis:   in context of acute illness or injury related to cardiac dysfunction, swallowing difficulty, decreased appetite as evidenced by intake 26-50%, lab values    Nutrition Interventions:   Food and/or Nutrient Delivery: Continue Current Diet, Continue Oral Nutrition Supplement  Nutrition Education/Counseling: No recommendation at this time  Coordination of Nutrition Care: Continue to monitor while inpatient, Interdisciplinary Rounds       Goals:  Goals: Meet at least 75% of estimated needs, prior to discharge  Type of Goal: Continue current goal       Nutrition Monitoring and Evaluation:   Behavioral-Environmental Outcomes: None Identified  Food/Nutrient Intake Outcomes: Food and Nutrient Intake  Physical Signs/Symptoms Outcomes: Chewing or Swallowing, Hemodynamic Status, Meal Time Behavior, Weight    Discharge Planning:    No discharge needs at this time     [address]  Contact: [phone]

## 2025-01-01 NOTE — DISCHARGE INSTRUCTIONS
Continue taking medications as prescribed, follow-up with PCP, neurology and neurosurgery as outpatient for further optimization of her medications and as needed procedure for NPH

## 2025-01-03 NOTE — DISCHARGE SUMMARY
inflammatory arthropathy.       Consultations:    Consults:     Final Specialist Recommendations/Findings:   IP CONSULT TO SOCIAL WORK  IP CONSULT TO NEUROLOGY      The patient was seen and examined on day of discharge and this discharge summary is in conjunction with any daily progress note from day of discharge.    Discharge plan:     Disposition: SNF    Physician Follow Up:     Ki Barrett MD  [address] [facility] [id number] [phone]    Schedule an appointment as soon as possible for a visit in 1 month(s)  outpatient neurology follow up for history of focal seizures and NPH    Leonor Galvez DO  [address] [id number] [phone]    Schedule an appointment as soon as possible for a visit in 1 month(s)  Neurosurgery referral for NPH and VPS consideration    Joaquim Abdullahi MD  [address] [facility]  [facility] [address] [phone]    Schedule an appointment as soon as possible for a visit in 1 week(s)         Requiring Further Evaluation/Follow Up POST HOSPITALIZATION/Incidental Findings: As described above in radiology section    Diet: Dysphagia diet    Activity: As tolerated    Instructions to Patient: Continue taking medications as prescribed, follow-up with PCP, neurology and neurosurgery as outpatient for further optimization of her medications and as needed procedure for NPH    Discharge Medications:      Medication List        START taking these medications      albuterol sulfate  (90 Base) MCG/ACT inhaler  Commonly known as: Ventolin HFA  Inhale 2 puffs into the lungs 4 times daily as needed for Wheezing     benzonatate 100 MG capsule  Commonly known as: TESSALON  Take 1 capsule by mouth 3 times daily as needed for Cough     Dextromethorphan-guaiFENesin  MG/5ML Syrp  Take 5 mLs by mouth every 4 hours as needed for Cough     levETIRAcetam 500 MG Tb24 extended release tablet  Commonly known as: Keppra XR  Take 1 tablet by mouth daily  Replaces: levETIRAcetam  MD Lizzy  1/2/2025  7:32 PM      Thank you Joaquim Montgomery MD for the opportunity to be involved in this patient's care.

## 2025-03-18 ENCOUNTER — OFFICE VISIT (OUTPATIENT)
Dept: ORTHOPEDIC SURGERY | Age: 89
End: 2025-03-18
Payer: MEDICARE

## 2025-03-18 VITALS — BODY MASS INDEX: 24.53 KG/M2 | RESPIRATION RATE: 14 BRPM | WEIGHT: 121.7 LBS | HEIGHT: 59 IN

## 2025-03-18 DIAGNOSIS — M25.511 RIGHT SHOULDER PAIN, UNSPECIFIED CHRONICITY: ICD-10-CM

## 2025-03-18 DIAGNOSIS — S29.012A STRAIN OF RIGHT RHOMBOID MUSCLE: Primary | ICD-10-CM

## 2025-03-18 PROCEDURE — 1123F ACP DISCUSS/DSCN MKR DOCD: CPT

## 2025-03-18 PROCEDURE — 1090F PRES/ABSN URINE INCON ASSESS: CPT

## 2025-03-18 PROCEDURE — G8420 CALC BMI NORM PARAMETERS: HCPCS

## 2025-03-18 PROCEDURE — 1125F AMNT PAIN NOTED PAIN PRSNT: CPT

## 2025-03-18 PROCEDURE — 99214 OFFICE O/P EST MOD 30 MIN: CPT

## 2025-03-18 PROCEDURE — G8428 CUR MEDS NOT DOCUMENT: HCPCS

## 2025-03-18 PROCEDURE — 1036F TOBACCO NON-USER: CPT

## 2025-03-18 NOTE — PROGRESS NOTES
this time.  I would recommend proceeding conservatively with anti-inflammatories and given the fact she has trouble taking pills I would advise a topical anti-inflammatory to the affected region.  I did also provide her with home exercise programs in her after visit summary that she should start up at her convenience doing multiple times a day.  She may continue with ice but I would switch to a heating pad as well as a gentle massage/stretching to the rhomboid musculature.  Ultimately if patient is still having difficulty in 1 to 2 weeks we can always try muscle relaxer at that time. Patient and her daughter are amendable to the plan as outlined above and I will see her back in my clinic as needed for any persistence or worsening of her symptoms but she was encouraged to contact our office with any questions or concerns that she may have.      Total time spent on visit: 35 minutes        This note is created with the assistance of a speech recognition program.  While intending to generate adocument that actually reflects the content of the visit, the document can still have some errors including those of syntax and sound a like substitutions which may escape proof reading.  It such instances, actual meaningcan be extrapolated by contextual diversion.    NA = Not assessed  RTC = Rotator cuff  RCT = Rotator cuff tear  ER = External rotation  IR = Internal rotation  AC = Acromioclavicular  GH = Glenohumeral  n = No  y = Yes